# Patient Record
Sex: FEMALE | Race: WHITE | NOT HISPANIC OR LATINO | Employment: FULL TIME | URBAN - METROPOLITAN AREA
[De-identification: names, ages, dates, MRNs, and addresses within clinical notes are randomized per-mention and may not be internally consistent; named-entity substitution may affect disease eponyms.]

---

## 2017-02-27 ENCOUNTER — OFFICE VISIT (OUTPATIENT)
Dept: INTERNAL MEDICINE | Facility: CLINIC | Age: 47
End: 2017-02-27
Payer: COMMERCIAL

## 2017-02-27 VITALS
DIASTOLIC BLOOD PRESSURE: 78 MMHG | RESPIRATION RATE: 15 BRPM | WEIGHT: 178 LBS | HEART RATE: 77 BPM | BODY MASS INDEX: 29.62 KG/M2 | SYSTOLIC BLOOD PRESSURE: 108 MMHG

## 2017-02-27 DIAGNOSIS — Z72.0 TOBACCO ABUSE: ICD-10-CM

## 2017-02-27 DIAGNOSIS — E03.8 SUBCLINICAL HYPOTHYROIDISM: Primary | ICD-10-CM

## 2017-02-27 LAB
T4 FREE SERPL-MCNC: 0.97 NG/DL (ref 0.76–1.46)
TSH SERPL DL<=0.005 MIU/L-ACNC: 6.91 MU/L (ref 0.4–4)

## 2017-02-27 PROCEDURE — 99214 OFFICE O/P EST MOD 30 MIN: CPT | Performed by: INTERNAL MEDICINE

## 2017-02-27 PROCEDURE — 36415 COLL VENOUS BLD VENIPUNCTURE: CPT | Performed by: INTERNAL MEDICINE

## 2017-02-27 PROCEDURE — 84443 ASSAY THYROID STIM HORMONE: CPT | Performed by: INTERNAL MEDICINE

## 2017-02-27 PROCEDURE — 84439 ASSAY OF FREE THYROXINE: CPT | Performed by: INTERNAL MEDICINE

## 2017-02-27 RX ORDER — VARENICLINE TARTRATE 1 MG/1
1 TABLET, FILM COATED ORAL 2 TIMES DAILY
Qty: 56 TABLET | Refills: 1 | Status: SHIPPED | OUTPATIENT
Start: 2017-02-27 | End: 2017-12-06

## 2017-02-27 RX ORDER — LEVOTHYROXINE SODIUM 125 UG/1
125 TABLET ORAL DAILY
Qty: 90 TABLET | Refills: 3 | Status: CANCELLED | OUTPATIENT
Start: 2017-02-27

## 2017-02-27 NOTE — LETTER
[facility]  [address]  [address]      February 27, 2017      Eve Byrd  [address] [place] [address]          Dear Eve,      I have enclosed a copy of your most recent labs done here at the clinic and if available some of your prior labs for comparison.     Your thyroid function tests are still slightly abnormal but stable and slightly improved and should be rechecked here in the clinic in 3 months with a follow-up visit with me.  I will look forward to seeing you at that time and please call to make an appointment.  Let's keep you on the same dose and repeat those labs in 3 months again and if still elevated or higher I would then consider another dose increase.    Please call me if you have further questions.        Prieto Vieira MD

## 2017-02-27 NOTE — MR AVS SNAPSHOT
"              After Visit Summary   2/27/2017    Eve Byrd    MRN: [id number]           Patient Information     Date Of Birth          1970        Visit Information        Provider Department      2/27/2017 10:00 AM Prieto Vieira MD [facility]        Today's Diagnoses     Subclinical hypothyroidism    -  1    Tobacco abuse           Follow-ups after your visit        Future tests that were ordered for you today     Open Future Orders        Priority Expected Expires Ordered    TSH with free T4 reflex Routine 5/1/2017 5/31/2017 2/27/2017            Who to contact     If you have questions or need follow up information about today's clinic visit or your schedule please contact [facility] directly at [phone].  Normal or non-critical lab and imaging results will be communicated to you by [employer]en[employer], letter or phone within 4 business days after the clinic has received the results. If you do not hear from us within 7 days, please contact the clinic through [employer]enU[employer] or phone. If you have a critical or abnormal lab result, we will notify you by phone as soon as possible.  Submit refill requests through [employer] or call your pharmacy and they will forward the refill request to us. Please allow 3 business days for your refill to be completed.          Additional Information About Your Visit        MugenUphart Information     [employer] lets you send messages to your doctor, view your test results, renew your prescriptions, schedule appointments and more. To sign up, go to [email or web address] . Click on \"Log in\" on the left side of the screen, which will take you to the Welcome page. Then click on \"Sign up Now\" on the right side of the page.     You will be asked to enter the access code listed below, as well as some personal information. Please follow the directions to create your username and password.     Your access code is: [email or web address]  Expires: 5/28/2017 " 10:23 AM     Your access code will  in 90 days. If you need help or a new code, please call your [facility] clinic or [phone].        Care EveryWhere ID     This is your Care EveryWhere ID. This could be used by other organizations to access your [facility] medical records  [id number]        Your Vitals Were     Pulse Respirations BMI (Body Mass Index)             77 15 29.62 kg/m2          Blood Pressure from Last 3 Encounters:   17 108/78   11/15/16 120/84   16 118/82    Weight from Last 3 Encounters:   17 178 lb (80.7 kg)   11/15/16 179 lb (81.2 kg)   16 180 lb 4.8 oz (81.8 kg)              We Performed the Following     TSH with free T4 reflex          Today's Medication Changes          These changes are accurate as of: 17 10:23 AM.  If you have any questions, ask your nurse or doctor.               Start taking these medicines.        Dose/Directions    * varenicline 0.5 MG X 11 & 1 MG X 42 tablet   Commonly known as:  CHANTIX STARTING MONTH DIEGO   Used for:  Tobacco abuse   Started by:  Prieto Vieira MD        Take 0.5 mg tab daily for 3 days, then 0.5 mg tab twice daily for 4 days, then 1 mg twice daily.   Quantity:  53 tablet   Refills:  0       * varenicline 1 MG tablet   Commonly known as:  CHANTIX   Used for:  Tobacco abuse   Started by:  Prieto Vieira MD        Dose:  1 mg   Take 1 tablet (1 mg) by mouth 2 times daily Profile   Quantity:  56 tablet   Refills:  1       * Notice:  This list has 2 medication(s) that are the same as other medications prescribed for you. Read the directions carefully, and ask your doctor or other care provider to review them with you.         Where to get your medicines      These medications were sent to [facility]/pharmacy #[id number] - [place], [place] - [address] [address]     Phone:  [phone]     varenicline 0.5 MG X 11 & 1 MG X 42 tablet    varenicline 1 MG tablet                Primary Care  Provider Office Phone # Fax #    Prieto Vieira [phone]       [facility] [address]  [facility] [address]        Thank you!     Thank you for choosing [facility]  for your care. Our goal is always to provide you with excellent care. Hearing back from our patients is one way we can continue to improve our services. Please take a few minutes to complete the written survey that you may receive in the mail after your visit with us. Thank you!             Your Updated Medication List - Protect others around you: Learn how to safely use, store and throw away your medicines at www.disposemymeds.org.          This list is accurate as of: 2/27/17 10:23 AM.  Always use your most recent med list.                   Brand Name Dispense Instructions for use    * albuterol (2.5 MG/3ML) 0.083% neb solution     30 vial    Take 1 vial (2.5 mg) by nebulization every 6 hours as needed for shortness of breath / dyspnea or wheezing       * albuterol 108 (90 BASE) MCG/ACT Inhaler    PROAIR HFA/PROVENTIL HFA/VENTOLIN HFA    1 Inhaler    Inhale 2 puffs into the lungs every 4 hours as needed       lamoTRIgine 25 MG tablet    LaMICtal    60 tablet    1 po BID       LATUDA PO      Take by mouth daily       levothyroxine 112 MCG tablet    SYNTHROID/LEVOTHROID    90 tablet    Take 1 tablet (112 mcg) by mouth daily       mometasone-formoterol 200-5 MCG/ACT oral inhaler    DULERA    13 g    Inhale 2 puffs into the lungs 2 times daily       montelukast 10 MG tablet    SINGULAIR    30 tablet    Take 1 tablet (10 mg) by mouth At Bedtime       * varenicline 0.5 MG X 11 & 1 MG X 42 tablet    CHANTIX STARTING MONTH DIEGO    53 tablet    Take 0.5 mg tab daily for 3 days, then 0.5 mg tab twice daily for 4 days, then 1 mg twice daily.       * varenicline 1 MG tablet    CHANTIX    56 tablet    Take 1 tablet (1 mg) by mouth 2 times daily Profile       * Notice:  This list has 4 medication(s)  that are the same as other medications prescribed for you. Read the directions carefully, and ask your doctor or other care provider to review them with you.

## 2017-02-27 NOTE — PROGRESS NOTES
SUBJECTIVE:                                                    Eve Byrd is a 46 year old female who presents to clinic today for the following health issues:    Hypothyroidism Follow-up      Since last visit, patient describes the following symptoms: Weight stable, no hair loss, no skin changes, no constipation, no loose stools       Amount of exercise or physical activity: minimum    Problems taking medications regularly: No    Medication side effects: none  Diet: low fat/cholesterol    TSH   Date Value Ref Range Status   11/01/2016 7.64 (H) 0.40 - 4.00 mU/L Final   ]    Problem list and histories reviewed & adjusted, as indicated.  Additional history: as documented    Patient Active Problem List   Diagnosis     Muscle weakness (generalized)     Acute respiratory failure (H)     Cholelithiasis     Cholecystitis     Bipolar affective disorder in remission (H)     Tobacco abuse     Past Surgical History   Procedure Laterality Date     Laparoscopic cholecystectomy  6/24/2014     Procedure: LAPAROSCOPIC CHOLECYSTECTOMY;  Surgeon: Maged Mccabe MD;  Location:  OR       Social History   Substance Use Topics     Smoking status: Current Every Day Smoker     Packs/day: 0.50     Years: 30.00     Types: Cigarettes     Smokeless tobacco: Never Used      Comment: 1/2 PPD     Alcohol use 0.0 oz/week     0 Standard drinks or equivalent per week      Comment: Socially      Family History   Problem Relation Age of Onset     DIABETES Maternal Grandfather      DIABETES Paternal Grandfather      Respiratory Mother      COPD         Current Outpatient Prescriptions   Medication Sig Dispense Refill     Lurasidone HCl (LATUDA PO) Take by mouth daily       levothyroxine (SYNTHROID, LEVOTHROID) 112 MCG tablet Take 1 tablet (112 mcg) by mouth daily 90 tablet 3     albuterol (PROAIR HFA, PROVENTIL HFA, VENTOLIN HFA) 108 (90 BASE) MCG/ACT inhaler Inhale 2 puffs into the lungs every 4 hours as needed 1 Inhaler 11      mometasone-formoterol (DULERA) 200-5 MCG/ACT oral inhaler Inhale 2 puffs into the lungs 2 times daily 13 g 5     montelukast (SINGULAIR) 10 MG tablet Take 1 tablet (10 mg) by mouth At Bedtime 30 tablet 0     albuterol (2.5 MG/3ML) 0.083% nebulizer solution Take 1 vial (2.5 mg) by nebulization every 6 hours as needed for shortness of breath / dyspnea or wheezing 30 vial 11     lamoTRIgine (LAMICTAL) 25 MG tablet 1 po BID 60 tablet 0     Allergies   Allergen Reactions     Cats      No Known Drug Allergy      BP Readings from Last 3 Encounters:   11/15/16 120/84   11/01/16 118/82   07/18/15 124/80    Wt Readings from Last 3 Encounters:   11/15/16 179 lb (81.2 kg)   11/01/16 180 lb 4.8 oz (81.8 kg)   07/18/15 190 lb (86.2 kg)                  Labs reviewed in EPIC  Problem list, Medication list, Allergies, and Medical/Social/Surgical histories reviewed in [facility] and updated as appropriate.    ROS:  Also interesterd in quitting smoking and has discussed the use of Chantix with her medications from her Psychiatrist who feels that she could use the medication w/o issues due to her stable medication dosing and clinical course  C: NEGATIVE for fever, chills, change in weight  E/M: NEGATIVE for ear, mouth and throat problems  R: NEGATIVE for significant cough or SOB  CV: NEGATIVE for chest pain, palpitations or peripheral edema  GI: NEGATIVE for nausea, abdominal pain, heartburn, or change in bowel habits  : NEGATIVE for frequency, dysuria, or hematuria  M: NEGATIVE for significant arthralgias or myalgia  H: NEGATIVE for bleeding problems  P: NEGATIVE for changes in mood or affect    OBJECTIVE:                                                    /78  Pulse 77  Resp 15  There is no height or weight on file to calculate BMI.  GENERAL: healthy, alert and no distress  NECK: no tenderness, no adenopathy, no asymmetry, no masses, no stiffness; thyroid- normal to palpation  RESP: lungs clear to auscultation - no rales, no  rhonchi, no wheezes  CV: regular rates and rhythm, normal S1 S2, no S3 or S4 and no murmur, no click or rub   MS: extremities- no gross deformities noted  PSYCH: Alert and oriented times 3; speech- coherent , normal rate and volume; able to articulate logical thoughts, able to abstract reason, no tangential thoughts, no hallucinations or delusions, affect- normal     ASSESSMENT/PLAN:                                                    (E03.9) Subclinical hypothyroidism  (primary encounter diagnosis)  Comment: repeat labs, higher dose adjusted prior  Plan: TSH with free T4 reflex, TSH with free T4         reflex            (Z72.0) Tobacco abuse  Comment: discussed medication.  Plan: varenicline (CHANTIX STARTING MONTH DIEGO) 0.5 MG        X 11 & 1 MG X 42 tablet, varenicline (CHANTIX)         1 MG tablet       There have been warnings to be on the lookout for erratic behavior, suicidal ideation, or suicidal behavior.    Please report any behavior or mood changes as well as being aware of drowsiness.   Be cautious when operating motor vehicles or dangerous machinery until the medication's effect on you is clear.    See Patient Instructions    Prieto Vieira MD  [facility]    25 minutes spent with this patient, face to face, discussing treatment options for listed problems above as well as side effects of appropriate medications.  Counseling time extended beyond 50% of the clinic visit.  Medication dosing, treatment plan and follow-up were discussed. Also reviewed need for primary care testing for patient.

## 2017-05-10 DIAGNOSIS — J45.20 INTERMITTENT ASTHMA, UNCOMPLICATED: ICD-10-CM

## 2017-05-10 RX ORDER — MOMETASONE FUROATE AND FORMOTEROL FUMARATE DIHYDRATE 200; 5 UG/1; UG/1
AEROSOL RESPIRATORY (INHALATION)
Qty: 13 INHALER | Refills: 0 | Status: SHIPPED | OUTPATIENT
Start: 2017-05-10 | End: 2017-12-06

## 2017-05-10 NOTE — TELEPHONE ENCOUNTER
mometasone-formoterol (DULERA) 200-5 MCG/ACT oral inhaler       Last Written Prescription Date: 10/11/16  Last Fill Quantity: 13g, # refills: 5    Last Office Visit with G, P or [facility] prescribing provider:  2/27/17   Future Office Visit:       Date of Last Asthma Action Plan Letter:   Asthma Action Plan Q1 Year    Topic Date Due     Asthma Action Plan - yearly  11/01/2017      Asthma Control Test:   ACT Total Scores 11/1/2016   ACT TOTAL SCORE -   ASTHMA ER VISITS -   ASTHMA HOSPITALIZATIONS -   ACT TOTAL SCORE (Goal Greater than or Equal to 20) 23   In the past 12 months, how many times did you visit the emergency room for your asthma without being admitted to the hospital? 0   In the past 12 months, how many times were you hospitalized overnight because of your asthma? 0       Date of Last Spirometry Test:   No results found for this or any previous visit.

## 2017-08-02 ENCOUNTER — OFFICE VISIT (OUTPATIENT)
Dept: INTERNAL MEDICINE | Facility: CLINIC | Age: 47
End: 2017-08-02
Payer: COMMERCIAL

## 2017-08-02 VITALS
BODY MASS INDEX: 32.95 KG/M2 | HEIGHT: 64 IN | WEIGHT: 193 LBS | OXYGEN SATURATION: 97 % | SYSTOLIC BLOOD PRESSURE: 122 MMHG | DIASTOLIC BLOOD PRESSURE: 78 MMHG | TEMPERATURE: 98.4 F | HEART RATE: 80 BPM

## 2017-08-02 DIAGNOSIS — E03.9 ACQUIRED HYPOTHYROIDISM: Primary | ICD-10-CM

## 2017-08-02 LAB — TSH SERPL DL<=0.005 MIU/L-ACNC: 1.9 MU/L (ref 0.4–4)

## 2017-08-02 PROCEDURE — 36415 COLL VENOUS BLD VENIPUNCTURE: CPT | Performed by: INTERNAL MEDICINE

## 2017-08-02 PROCEDURE — 99213 OFFICE O/P EST LOW 20 MIN: CPT | Performed by: INTERNAL MEDICINE

## 2017-08-02 PROCEDURE — 84443 ASSAY THYROID STIM HORMONE: CPT | Performed by: INTERNAL MEDICINE

## 2017-08-02 RX ORDER — LEVOTHYROXINE SODIUM 112 UG/1
112 TABLET ORAL DAILY
Qty: 90 TABLET | Refills: 3 | Status: SHIPPED | OUTPATIENT
Start: 2017-08-02 | End: 2018-09-01

## 2017-08-02 NOTE — NURSING NOTE
"Chief Complaint   Patient presents with     Thyroid Problem       Initial /78  Pulse 80  Temp 98.4  F (36.9  C) (Oral)  Ht 5' 3.5\" (1.613 m)  Wt 193 lb (87.5 kg)  LMP 04/12/2017  SpO2 97%  BMI 33.65 kg/m2 Estimated body mass index is 33.65 kg/(m^2) as calculated from the following:    Height as of this encounter: 5' 3.5\" (1.613 m).    Weight as of this encounter: 193 lb (87.5 kg).  Medication Reconciliation: complete  "

## 2017-08-02 NOTE — MR AVS SNAPSHOT
"              After Visit Summary   2017    Eve Byrd    MRN: [id number]           Patient Information     Date Of Birth          1970        Visit Information        Provider Department      2017 8:40 AM Prieto Vieira MD [facility]        Today's Diagnoses     Acquired hypothyroidism    -  1       Follow-ups after your visit        Follow-up notes from your care team     Return if symptoms worsen or fail to improve.      Who to contact     If you have questions or need follow up information about today's clinic visit or your schedule please contact [facility] directly at [phone].  Normal or non-critical lab and imaging results will be communicated to you by MyChart, letter or phone within 4 business days after the clinic has received the results. If you do not hear from us within 7 days, please contact the clinic through MyChart or phone. If you have a critical or abnormal lab result, we will notify you by phone as soon as possible.  Submit refill requests through [employer] or call your pharmacy and they will forward the refill request to us. Please allow 3 business days for your refill to be completed.          Additional Information About Your Visit        MyChart Information     [employer] lets you send messages to your doctor, view your test results, renew your prescriptions, schedule appointments and more. To sign up, go to [email or web address] . Click on \"Log in\" on the left side of the screen, which will take you to the Welcome page. Then click on \"Sign up Now\" on the right side of the page.     You will be asked to enter the access code listed below, as well as some personal information. Please follow the directions to create your username and password.     Your access code is: [email or web address]  Expires: 10/31/2017  9:10 AM     Your access code will  in 90 days. If you need help or a new code, please call your [facility] or " "[phone].        Care EveryWhere ID     This is your Care EveryWhere ID. This could be used by other organizations to access your [place] medical records  [id number]        Your Vitals Were     Pulse Temperature Height Last Period Pulse Oximetry BMI (Body Mass Index)    80 98.4  F (36.9  C) (Oral) 5' 3.5\" (1.613 m) 04/12/2017 97% 33.65 kg/m2       Blood Pressure from Last 3 Encounters:   08/02/17 122/78   02/27/17 108/78   11/15/16 120/84    Weight from Last 3 Encounters:   08/02/17 193 lb (87.5 kg)   02/27/17 178 lb (80.7 kg)   11/15/16 179 lb (81.2 kg)              We Performed the Following     TSH with free T4 reflex          Where to get your medicines      These medications were sent to [facility]/pharmacy #[id number] - [place], [place] - [address] [address]     Phone:  [phone]     levothyroxine 112 MCG tablet          Primary Care Provider Office Phone # Fax #    Prieto Vieira [phone]       [facility] [address] [facility] [address]        Equal Access to Services     HECTOR KRAMER AH: Hadii naila ku hadasho Soomaali, waaxda luqadaha, qaybta kaalmada adeegyada, almaz sebastian. So [facility] [phone].    ATENCIÓN: Si habla español, tiene a chandler disposición servicios gratuitos de asistencia lingüística. Llame al [phone].    We comply with applicable federal civil rights laws and [place] laws. We do not discriminate on the basis of race, color, national origin, age, disability sex, sexual orientation or gender identity.            Thank you!     Thank you for choosing [facility]  for your care. Our goal is always to provide you with excellent care. Hearing back from our patients is one way we can continue to improve our services. Please take a few minutes to complete the written survey that you may receive in the mail after your visit with us. Thank you!             Your Updated " Medication List - Protect others around you: Learn how to safely use, store and throw away your medicines at www.disposemymeds.org.          This list is accurate as of: 8/2/17  9:10 AM.  Always use your most recent med list.                   Brand Name Dispense Instructions for use Diagnosis    albuterol 108 (90 BASE) MCG/ACT Inhaler    PROAIR HFA/PROVENTIL HFA/VENTOLIN HFA    1 Inhaler    Inhale 2 puffs into the lungs every 4 hours as needed    Intermittent asthma, uncomplicated       DULERA 200-5 MCG/ACT oral inhaler   Generic drug:  mometasone-formoterol     13 Inhaler    INHALE 2 PUFFS INTO THE LUNGS 2 TIMES DAILY    Intermittent asthma, uncomplicated       lamoTRIgine 25 MG tablet    LaMICtal    60 tablet    1 po BID    Bipolar disorder (H)       LATUDA PO      Take by mouth daily        levothyroxine 112 MCG tablet    SYNTHROID/LEVOTHROID    90 tablet    Take 1 tablet (112 mcg) by mouth daily    Acquired hypothyroidism       montelukast 10 MG tablet    SINGULAIR    30 tablet    Take 1 tablet (10 mg) by mouth At Bedtime    Moderate persistent asthma       * varenicline 0.5 MG X 11 & 1 MG X 42 tablet    CHANTIX STARTING MONTH DIEGO    53 tablet    Take 0.5 mg tab daily for 3 days, then 0.5 mg tab twice daily for 4 days, then 1 mg twice daily.    Tobacco abuse       * varenicline 1 MG tablet    CHANTIX    56 tablet    Take 1 tablet (1 mg) by mouth 2 times daily Profile    Tobacco abuse       * Notice:  This list has 2 medication(s) that are the same as other medications prescribed for you. Read the directions carefully, and ask your doctor or other care provider to review them with you.

## 2017-08-02 NOTE — PROGRESS NOTES
SUBJECTIVE:                                                    Eve Byrd is a 47 year old female who presents to clinic today for the following health issues:    Hypothyroidism Follow-up      Since last visit, patient describes the following symptoms: Weight stable, no hair loss, no skin changes, no constipation, no loose stools      Amount of exercise or physical activity: None    Problems taking medications regularly: No    Medication side effects: none    Diet: regular (no restrictions)    Problem list and histories reviewed & adjusted, as indicated.  Additional history: as documented    Patient Active Problem List   Diagnosis     Muscle weakness (generalized)     Acute respiratory failure (H)     Cholelithiasis     Cholecystitis     Bipolar affective disorder in remission (H)     Tobacco abuse     Past Surgical History:   Procedure Laterality Date     LAPAROSCOPIC CHOLECYSTECTOMY  6/24/2014    Procedure: LAPAROSCOPIC CHOLECYSTECTOMY;  Surgeon: Maged Mccabe MD;  Location:  OR       Social History   Substance Use Topics     Smoking status: Current Every Day Smoker     Packs/day: 0.50     Years: 30.00     Types: Cigarettes     Smokeless tobacco: Never Used      Comment: 1/2 PPD     Alcohol use 0.0 oz/week     0 Standard drinks or equivalent per week      Comment: Socially      Family History   Problem Relation Age of Onset     DIABETES Maternal Grandfather      DIABETES Paternal Grandfather      Respiratory Mother      COPD         Current Outpatient Prescriptions   Medication Sig Dispense Refill     DULERA 200-5 MCG/ACT oral inhaler INHALE 2 PUFFS INTO THE LUNGS 2 TIMES DAILY 13 Inhaler 0     varenicline (CHANTIX STARTING MONTH PAK) 0.5 MG X 11 & 1 MG X 42 tablet Take 0.5 mg tab daily for 3 days, then 0.5 mg tab twice daily for 4 days, then 1 mg twice daily. 53 tablet 0     varenicline (CHANTIX) 1 MG tablet Take 1 tablet (1 mg) by mouth 2 times daily Profile 56 tablet 1     Lurasidone HCl (LATUDA  "PO) Take by mouth daily       levothyroxine (SYNTHROID, LEVOTHROID) 112 MCG tablet Take 1 tablet (112 mcg) by mouth daily 90 tablet 3     albuterol (PROAIR HFA, PROVENTIL HFA, VENTOLIN HFA) 108 (90 BASE) MCG/ACT inhaler Inhale 2 puffs into the lungs every 4 hours as needed 1 Inhaler 11     montelukast (SINGULAIR) 10 MG tablet Take 1 tablet (10 mg) by mouth At Bedtime 30 tablet 0     albuterol (2.5 MG/3ML) 0.083% nebulizer solution Take 1 vial (2.5 mg) by nebulization every 6 hours as needed for shortness of breath / dyspnea or wheezing 30 vial 11     lamoTRIgine (LAMICTAL) 25 MG tablet 1 po BID 60 tablet 0     Allergies   Allergen Reactions     Cats      No Known Drug Allergy      BP Readings from Last 3 Encounters:   02/27/17 108/78   11/15/16 120/84   11/01/16 118/82    Wt Readings from Last 3 Encounters:   02/27/17 178 lb (80.7 kg)   11/15/16 179 lb (81.2 kg)   11/01/16 180 lb 4.8 oz (81.8 kg)              Labs reviewed in EPIC      Reviewed and updated as needed this visit by clinical staff     Reviewed and updated as needed this visit by Provider         ROS:  C: NEGATIVE for fever, chills, change in weight  E/M: NEGATIVE for ear, mouth and throat problems  R: NEGATIVE for significant cough or SOB  CV: NEGATIVE for chest pain, palpitations or peripheral edema  GI: NEGATIVE for nausea, abdominal pain, heartburn, or change in bowel habits  : NEGATIVE for frequency, dysuria, or hematuria  M: NEGATIVE for significant arthralgias or myalgia  N: NEGATIVE for weakness, dizziness or paresthesias  H: NEGATIVE for bleeding problems  P: NEGATIVE for changes in mood or affect    OBJECTIVE:                                                    /78  Pulse 80  Temp 98.4  F (36.9  C) (Oral)  Ht 5' 3.5\" (1.613 m)  Wt 193 lb (87.5 kg)  LMP 04/12/2017  SpO2 97%  BMI 33.65 kg/m2  Body mass index is 33.65 kg/(m^2).  GENERAL: healthy, alert and no distress  EYES: Eyes grossly normal to inspection, extraocular movements - " intact, and PERRL  HENT: ear canals- normal; TMs- normal; Nose- normal; Mouth- no ulcers, no lesions  NECK: no tenderness, no adenopathy, no asymmetry, no masses, no stiffness; thyroid- normal to visual inspection.  RESP: lungs clear to auscultation - no rales, no rhonchi, no wheezes  CV: regular rates and rhythm, normal S1 S2, no S3 or S4 and no click or rub   MS: extremities- no gross deformities noted  PSYCH: Alert and oriented times 3; speech- coherent , normal rate and volume; able to articulate logical thoughts, able to abstract reason, no tangential thoughts, no hallucinations or delusions, affect- normal     ASSESSMENT/PLAN:                                                      (E03.9) Acquired hypothyroidism  (primary encounter diagnosis)  Comment: repeat labs as last check subclinical  Plan: TSH with free T4 reflex, levothyroxine         (SYNTHROID/LEVOTHROID) 112 MCG tablet              See Patient Instructions    Prieto Vieira MD  [facility]

## 2017-08-02 NOTE — LETTER
[facility]  [address]  [address]      August 2, 2017      Eve Byrd  [address] [place] [id number]          Dear Eve,      I have enclosed a copy of your most recent labs done here at the clinic and if available some of your prior labs for comparison.     Your thyroid function tests look good and thus I would not change anything at this point.    Please call me if you have further questions.        Prieto Vieira MD

## 2017-11-29 ENCOUNTER — RADIANT APPOINTMENT (OUTPATIENT)
Dept: MAMMOGRAPHY | Facility: CLINIC | Age: 47
End: 2017-11-29
Attending: INTERNAL MEDICINE
Payer: COMMERCIAL

## 2017-11-29 DIAGNOSIS — Z12.31 VISIT FOR SCREENING MAMMOGRAM: ICD-10-CM

## 2017-11-29 PROCEDURE — G0202 SCR MAMMO BI INCL CAD: HCPCS | Mod: TC

## 2017-12-06 ENCOUNTER — OFFICE VISIT (OUTPATIENT)
Dept: INTERNAL MEDICINE | Facility: CLINIC | Age: 47
End: 2017-12-06
Payer: COMMERCIAL

## 2017-12-06 VITALS
OXYGEN SATURATION: 92 % | SYSTOLIC BLOOD PRESSURE: 116 MMHG | DIASTOLIC BLOOD PRESSURE: 70 MMHG | HEART RATE: 82 BPM | HEIGHT: 64 IN | WEIGHT: 204 LBS | TEMPERATURE: 98.2 F | BODY MASS INDEX: 34.83 KG/M2

## 2017-12-06 DIAGNOSIS — Z72.0 TOBACCO ABUSE: ICD-10-CM

## 2017-12-06 DIAGNOSIS — Z13.6 CARDIOVASCULAR SCREENING; LDL GOAL LESS THAN 160: ICD-10-CM

## 2017-12-06 DIAGNOSIS — Z00.00 ENCOUNTER FOR ROUTINE ADULT HEALTH EXAMINATION WITHOUT ABNORMAL FINDINGS: Primary | ICD-10-CM

## 2017-12-06 DIAGNOSIS — J45.20 MILD INTERMITTENT ASTHMA, UNSPECIFIED WHETHER COMPLICATED: ICD-10-CM

## 2017-12-06 DIAGNOSIS — F31.70 BIPOLAR AFFECTIVE DISORDER IN REMISSION (H): ICD-10-CM

## 2017-12-06 DIAGNOSIS — E03.9 ACQUIRED HYPOTHYROIDISM: ICD-10-CM

## 2017-12-06 LAB
ALBUMIN SERPL-MCNC: 4.1 G/DL (ref 3.4–5)
ALP SERPL-CCNC: 76 U/L (ref 40–150)
ALT SERPL W P-5'-P-CCNC: 38 U/L (ref 0–50)
ANION GAP SERPL CALCULATED.3IONS-SCNC: 8 MMOL/L (ref 3–14)
AST SERPL W P-5'-P-CCNC: 21 U/L (ref 0–45)
BILIRUB SERPL-MCNC: 0.2 MG/DL (ref 0.2–1.3)
BUN SERPL-MCNC: 9 MG/DL (ref 7–30)
CALCIUM SERPL-MCNC: 8.8 MG/DL (ref 8.5–10.1)
CHLORIDE SERPL-SCNC: 108 MMOL/L (ref 94–109)
CHOLEST SERPL-MCNC: 251 MG/DL
CO2 SERPL-SCNC: 26 MMOL/L (ref 20–32)
CREAT SERPL-MCNC: 1.08 MG/DL (ref 0.52–1.04)
GFR SERPL CREATININE-BSD FRML MDRD: 54 ML/MIN/1.7M2
GLUCOSE SERPL-MCNC: 95 MG/DL (ref 70–99)
HDLC SERPL-MCNC: 83 MG/DL
HGB BLD-MCNC: 12.8 G/DL (ref 11.7–15.7)
LDLC SERPL CALC-MCNC: 122 MG/DL
NONHDLC SERPL-MCNC: 168 MG/DL
POTASSIUM SERPL-SCNC: 4 MMOL/L (ref 3.4–5.3)
PROT SERPL-MCNC: 7.7 G/DL (ref 6.8–8.8)
SODIUM SERPL-SCNC: 142 MMOL/L (ref 133–144)
TRIGL SERPL-MCNC: 232 MG/DL

## 2017-12-06 PROCEDURE — 85018 HEMOGLOBIN: CPT | Performed by: INTERNAL MEDICINE

## 2017-12-06 PROCEDURE — 80061 LIPID PANEL: CPT | Performed by: INTERNAL MEDICINE

## 2017-12-06 PROCEDURE — 80053 COMPREHEN METABOLIC PANEL: CPT | Performed by: INTERNAL MEDICINE

## 2017-12-06 PROCEDURE — 36415 COLL VENOUS BLD VENIPUNCTURE: CPT | Performed by: INTERNAL MEDICINE

## 2017-12-06 PROCEDURE — 99396 PREV VISIT EST AGE 40-64: CPT | Performed by: INTERNAL MEDICINE

## 2017-12-06 NOTE — LETTER
[facility]  [address] [address]  [phone]      12/6/2017       Eve Byrd  [address] [place] [id number]        Dear Eve,    I am pleased to inform you that your routine blood work including your hemoglobin, sodium, potassium, calcium and liver function tests are all normal.    Your cholesterol is high and could be treated more aggressively.  Please follow-up with me to discuss your further medication options if you are interested.    Your kidney function test is also slightly abnormal and elevated and should be rechecked here in the clinic in 3 months with a follow-up visit with me.  I will look forward to seeing you at that time and please call to make an appointment.    Sincerely,      Prieto Vieira MD  Internal Medicine

## 2017-12-06 NOTE — MR AVS SNAPSHOT
After Visit Summary   12/6/2017    Eve Byrd    MRN: [id number]           Patient Information     Date Of Birth          1970        Visit Information        Provider Department      12/6/2017 10:20 AM Prieto Vieira MD [facility]        Today's Diagnoses     Encounter for routine adult health examination without abnormal findings    -  1    Acquired hypothyroidism        Bipolar affective disorder in remission (H)        Mild intermittent asthma, unspecified whether complicated        CARDIOVASCULAR SCREENING; LDL GOAL LESS THAN 160        Tobacco abuse          Care Instructions      Preventive Health Recommendations  Female Ages 40 to 49    Yearly exam:     See your health care provider every year in order to  1. Review health changes.   2. Discuss preventive care.    3. Review your medicines if your doctor prescribed any.      Get a Pap test every three years (unless you have an abnormal result and your provider advises testing more often).      If you get Pap tests with HPV test, you only need to test every 5 years, unless you have an abnormal result. You do not need a Pap test if your uterus was removed (hysterectomy) and you have not had cancer.      You should be tested each year for STDs (sexually transmitted diseases), if you're at risk.       Ask your doctor if you should have a mammogram.      Have a colonoscopy (test for colon cancer) if someone in your family has had colon cancer or polyps before age 50.       Have a cholesterol test every 5 years.       Have a diabetes test (fasting glucose) after age 45. If you are at risk for diabetes, you should have this test every 3 years.    Shots: Get a flu shot each year. Get a tetanus shot every 10 years.     Nutrition:     Eat at least 5 servings of fruits and vegetables each day.    Eat whole-grain bread, whole-wheat pasta and brown rice instead of white grains and rice.    Talk to your provider about  "Calcium and Vitamin D.     Lifestyle    Exercise at least 150 minutes a week (an average of 30 minutes a day, 5 days a week). This will help you control your weight and prevent disease.    Limit alcohol to one drink per day.    No smoking.     Wear sunscreen to prevent skin cancer.    See your dentist every six months for an exam and cleaning.          Follow-ups after your visit        Who to contact     If you have questions or need follow up information about today's clinic visit or your schedule please contact [facility] directly at [phone].  Normal or non-critical lab and imaging results will be communicated to you by [employer], letter or phone within 4 business days after the clinic has received the results. If you do not hear from us within 7 days, please contact the clinic through [employer] or phone. If you have a critical or abnormal lab result, we will notify you by phone as soon as possible.  Submit refill requests through [employer] or call your pharmacy and they will forward the refill request to us. Please allow 3 business days for your refill to be completed.          Additional Information About Your Visit        [employer] Information     [employer] lets you send messages to your doctor, view your test results, renew your prescriptions, schedule appointments and more. To sign up, go to [email or web address] . Click on \"Log in\" on the left side of the screen, which will take you to the Welcome page. Then click on \"Sign up Now\" on the right side of the page.     You will be asked to enter the access code listed below, as well as some personal information. Please follow the directions to create your username and password.     Your access code is: [email or web address]  Expires: 3/6/2018 10:40 AM     Your access code will  in 90 days. If you need help or a new code, please call your [facility] or [phone].        Care EveryWhere ID     This is your Care EveryWhere ID. This could " "be used by other organizations to access your [facility] medical records  [id number]        Your Vitals Were     Pulse Temperature Height Pulse Oximetry BMI (Body Mass Index)       82 98.2  F (36.8  C) (Oral) 5' 3.5\" (1.613 m) 92% 35.57 kg/m2        Blood Pressure from Last 3 Encounters:   12/06/17 116/70   08/02/17 122/78   02/27/17 108/78    Weight from Last 3 Encounters:   12/06/17 204 lb (92.5 kg)   08/02/17 193 lb (87.5 kg)   02/27/17 178 lb (80.7 kg)              We Performed the Following     Comprehensive metabolic panel     Hemoglobin     Lipid Profile        Primary Care Provider Office Phone # Fax #    Prieto Vieira [phone]       [address] [facility] [address]        Equal Access to Services     [facility]: Hadii aad ku hadasho Soomaali, waaxda luqadaha, qaybta kaalmada adeegyada, waxay larisain hayaan flavia bean . So [facility] [phone].    ATENCIÓN: Si habla español, tiene a chandler disposición servicios gratuitos de asistencia lingüística. Llame al [phone].    We comply with applicable federal civil rights laws and [place] laws. We do not discriminate on the basis of race, color, national origin, age, disability, sex, sexual orientation, or gender identity.            Thank you!     Thank you for choosing [facility]  for your care. Our goal is always to provide you with excellent care. Hearing back from our patients is one way we can continue to improve our services. Please take a few minutes to complete the written survey that you may receive in the mail after your visit with us. Thank you!             Your Updated Medication List - Protect others around you: Learn how to safely use, store and throw away your medicines at www.disposemymeds.org.          This list is accurate as of: 12/6/17 10:40 AM.  Always use your most recent med list.                   Brand Name Dispense Instructions for use Diagnosis    albuterol 108 (90 BASE) " MCG/ACT Inhaler    PROAIR HFA/PROVENTIL HFA/VENTOLIN HFA    1 Inhaler    Inhale 2 puffs into the lungs every 4 hours as needed    Intermittent asthma, uncomplicated       lamoTRIgine 25 MG tablet    LaMICtal    60 tablet    1 po BID    Bipolar disorder (H)       LATUDA PO      Take by mouth daily        levothyroxine 112 MCG tablet    SYNTHROID/LEVOTHROID    90 tablet    Take 1 tablet (112 mcg) by mouth daily    Acquired hypothyroidism       montelukast 10 MG tablet    SINGULAIR    30 tablet    Take 1 tablet (10 mg) by mouth At Bedtime    Moderate persistent asthma

## 2017-12-06 NOTE — NURSING NOTE
"Chief Complaint   Patient presents with     Physical       Initial /70  Pulse 82  Temp 98.2  F (36.8  C) (Oral)  Ht 5' 3.5\" (1.613 m)  Wt 204 lb (92.5 kg)  SpO2 92%  BMI 35.57 kg/m2 Estimated body mass index is 35.57 kg/(m^2) as calculated from the following:    Height as of this encounter: 5' 3.5\" (1.613 m).    Weight as of this encounter: 204 lb (92.5 kg).  Medication Reconciliation: complete   Sue Ng CMA      "

## 2017-12-07 ASSESSMENT — ASTHMA QUESTIONNAIRES: ACT_TOTALSCORE: 24

## 2018-09-01 DIAGNOSIS — E03.9 ACQUIRED HYPOTHYROIDISM: ICD-10-CM

## 2018-09-01 NOTE — TELEPHONE ENCOUNTER
"Requested Prescriptions   Pending Prescriptions Disp Refills     levothyroxine (SYNTHROID/LEVOTHROID) 112 MCG tablet [Pharmacy Med Name: LEVOTHYROXINE 112 MCG TABLET] 90 tablet 2    Last Written Prescription Date:  8/2/2017  Last Fill Quantity: 90,  # refills: 3   Last office visit: 12/6/2017 with prescribing provider:  12/6/2017   Future Office Visit:     Sig: TAKE 1 TABLET (112 MCG) BY MOUTH DAILY    Thyroid Protocol Failed    9/1/2018  2:04 AM       Failed - Normal TSH on file in past 12 months    Recent Labs   Lab Test  08/02/17   0907   TSH  1.90             Passed - Patient is 12 years or older       Passed - Recent (12 mo) or future (30 days) visit within the authorizing provider's specialty    Patient had office visit in the last 12 months or has a visit in the next 30 days with authorizing provider or within the authorizing provider's specialty.  See \"Patient Info\" tab in inbasket, or \"Choose Columns\" in Meds & Orders section of the refill encounter.           Passed - No active pregnancy on record    If patient is pregnant or has had a positive pregnancy test, please check TSH.         Passed - No positive pregnancy test in past 12 months    If patient is pregnant or has had a positive pregnancy test, please check TSH.            "

## 2018-09-04 RX ORDER — LEVOTHYROXINE SODIUM 112 UG/1
TABLET ORAL
Qty: 30 TABLET | Refills: 0 | Status: SHIPPED | OUTPATIENT
Start: 2018-09-04 | End: 2019-01-16

## 2019-01-03 ENCOUNTER — HOSPITAL ENCOUNTER (EMERGENCY)
Facility: CLINIC | Age: 49
Discharge: HOME | End: 2019-01-03
Attending: EMERGENCY MEDICINE | Admitting: EMERGENCY MEDICINE
Payer: COMMERCIAL

## 2019-01-03 ENCOUNTER — APPOINTMENT (OUTPATIENT)
Dept: GENERAL RADIOLOGY | Facility: CLINIC | Age: 49
End: 2019-01-03
Payer: COMMERCIAL

## 2019-01-03 VITALS
OXYGEN SATURATION: 98 % | TEMPERATURE: 99.3 F | HEART RATE: 87 BPM | BODY MASS INDEX: 34.15 KG/M2 | RESPIRATION RATE: 16 BRPM | SYSTOLIC BLOOD PRESSURE: 140 MMHG | HEIGHT: 64 IN | DIASTOLIC BLOOD PRESSURE: 100 MMHG | WEIGHT: 200 LBS

## 2019-01-03 DIAGNOSIS — K29.00 ACUTE SUPERFICIAL GASTRITIS WITHOUT HEMORRHAGE: ICD-10-CM

## 2019-01-03 DIAGNOSIS — R06.02 SHORTNESS OF BREATH: ICD-10-CM

## 2019-01-03 DIAGNOSIS — F41.9 ANXIETY: ICD-10-CM

## 2019-01-03 DIAGNOSIS — R07.9 ACUTE CHEST PAIN: ICD-10-CM

## 2019-01-03 LAB
ALBUMIN SERPL-MCNC: 4.5 G/DL (ref 3.4–5)
ALP SERPL-CCNC: 72 U/L (ref 40–150)
ALT SERPL W P-5'-P-CCNC: 80 U/L (ref 0–50)
ANION GAP SERPL CALCULATED.3IONS-SCNC: 13 MMOL/L (ref 3–14)
AST SERPL W P-5'-P-CCNC: 49 U/L (ref 0–45)
BASOPHILS # BLD AUTO: 0 10E9/L (ref 0–0.2)
BASOPHILS NFR BLD AUTO: 0.6 %
BILIRUB SERPL-MCNC: 0.6 MG/DL (ref 0.2–1.3)
BUN SERPL-MCNC: 12 MG/DL (ref 7–30)
CALCIUM SERPL-MCNC: 9.1 MG/DL (ref 8.5–10.1)
CHLORIDE SERPL-SCNC: 106 MMOL/L (ref 94–109)
CO2 SERPL-SCNC: 20 MMOL/L (ref 20–32)
CREAT SERPL-MCNC: 1.07 MG/DL (ref 0.52–1.04)
D DIMER PPP FEU-MCNC: 0.5 UG/ML FEU (ref 0–0.5)
DIFFERENTIAL METHOD BLD: ABNORMAL
EOSINOPHIL # BLD AUTO: 0.1 10E9/L (ref 0–0.7)
EOSINOPHIL NFR BLD AUTO: 2.3 %
ERYTHROCYTE [DISTWIDTH] IN BLOOD BY AUTOMATED COUNT: 12.2 % (ref 10–15)
GFR SERPL CREATININE-BSD FRML MDRD: 61 ML/MIN/{1.73_M2}
GLUCOSE SERPL-MCNC: 97 MG/DL (ref 70–99)
HCT VFR BLD AUTO: 41.3 % (ref 35–47)
HGB BLD-MCNC: 14.3 G/DL (ref 11.7–15.7)
IMM GRANULOCYTES # BLD: 0 10E9/L (ref 0–0.4)
IMM GRANULOCYTES NFR BLD: 0.3 %
INTERPRETATION ECG - MUSE: NORMAL
LIPASE SERPL-CCNC: 170 U/L (ref 73–393)
LYMPHOCYTES # BLD AUTO: 1.1 10E9/L (ref 0.8–5.3)
LYMPHOCYTES NFR BLD AUTO: 31.1 %
MCH RBC QN AUTO: 35.1 PG (ref 26.5–33)
MCHC RBC AUTO-ENTMCNC: 34.6 G/DL (ref 31.5–36.5)
MCV RBC AUTO: 102 FL (ref 78–100)
MONOCYTES # BLD AUTO: 0.3 10E9/L (ref 0–1.3)
MONOCYTES NFR BLD AUTO: 8.5 %
NEUTROPHILS # BLD AUTO: 2 10E9/L (ref 1.6–8.3)
NEUTROPHILS NFR BLD AUTO: 57.2 %
NRBC # BLD AUTO: 0 10*3/UL
NRBC BLD AUTO-RTO: 0 /100
PLATELET # BLD AUTO: 179 10E9/L (ref 150–450)
POTASSIUM SERPL-SCNC: 3.9 MMOL/L (ref 3.4–5.3)
PROT SERPL-MCNC: 8.3 G/DL (ref 6.8–8.8)
RBC # BLD AUTO: 4.07 10E12/L (ref 3.8–5.2)
SODIUM SERPL-SCNC: 139 MMOL/L (ref 133–144)
TROPONIN I SERPL-MCNC: <0.015 UG/L (ref 0–0.04)
WBC # BLD AUTO: 3.5 10E9/L (ref 4–11)

## 2019-01-03 PROCEDURE — 85379 FIBRIN DEGRADATION QUANT: CPT | Performed by: EMERGENCY MEDICINE

## 2019-01-03 PROCEDURE — 99285 EMERGENCY DEPT VISIT HI MDM: CPT | Mod: 25

## 2019-01-03 PROCEDURE — 25000132 ZZH RX MED GY IP 250 OP 250 PS 637: Performed by: EMERGENCY MEDICINE

## 2019-01-03 PROCEDURE — 25000125 ZZHC RX 250: Performed by: EMERGENCY MEDICINE

## 2019-01-03 PROCEDURE — 85025 COMPLETE CBC W/AUTO DIFF WBC: CPT | Performed by: EMERGENCY MEDICINE

## 2019-01-03 PROCEDURE — 80053 COMPREHEN METABOLIC PANEL: CPT | Performed by: EMERGENCY MEDICINE

## 2019-01-03 PROCEDURE — 71046 X-RAY EXAM CHEST 2 VIEWS: CPT

## 2019-01-03 PROCEDURE — 83690 ASSAY OF LIPASE: CPT | Performed by: EMERGENCY MEDICINE

## 2019-01-03 PROCEDURE — 93005 ELECTROCARDIOGRAM TRACING: CPT

## 2019-01-03 PROCEDURE — 84484 ASSAY OF TROPONIN QUANT: CPT | Performed by: EMERGENCY MEDICINE

## 2019-01-03 RX ORDER — SUCRALFATE ORAL 1 G/10ML
1 SUSPENSION ORAL 4 TIMES DAILY
Qty: 1200 ML | Refills: 0 | Status: SHIPPED | OUTPATIENT
Start: 2019-01-03 | End: 2019-07-17

## 2019-01-03 RX ORDER — PANTOPRAZOLE SODIUM 40 MG/1
40 TABLET, DELAYED RELEASE ORAL DAILY
Qty: 30 TABLET | Refills: 0 | Status: SHIPPED | OUTPATIENT
Start: 2019-01-03 | End: 2019-07-17

## 2019-01-03 RX ADMIN — LIDOCAINE HYDROCHLORIDE 30 ML: 20 SOLUTION ORAL; TOPICAL at 20:38

## 2019-01-03 ASSESSMENT — ENCOUNTER SYMPTOMS
VOMITING: 0
SHORTNESS OF BREATH: 0
DIAPHORESIS: 1
TREMORS: 1
NERVOUS/ANXIOUS: 1
BACK PAIN: 1
NAUSEA: 0

## 2019-01-03 ASSESSMENT — MIFFLIN-ST. JEOR: SCORE: 1517.19

## 2019-01-03 NOTE — LETTER
January 3, 2019      To Whom It May Concern:      Eve Byrd was seen in our Emergency Department today, 01/03/19.  I expect her condition to improve over the next 1-2 days.  She may return to work/school when improved.    Sincerely,        Lesli MONTOYA RN

## 2019-01-03 NOTE — ED AVS SNAPSHOT
Emergency Department  [address] [facility] [address]  Phone:  [phone]  Fax:  [phone]                                    Eve Byrd   MRN: [id number]    Department:   Emergency Department   Date of Visit:  1/3/2019           After Visit Summary Signature Page    I have received my discharge instructions, and my questions have been answered. I have discussed any challenges I see with this plan with the nurse or doctor.    ..........................................................................................................................................  Patient/Patient Representative Signature      ..........................................................................................................................................  Patient Representative Print Name and Relationship to Patient    ..................................................               ................................................  Date                                   Time    ..........................................................................................................................................  Reviewed by Signature/Title    ...................................................              ..............................................  Date                                               Time          22EPIC Rev 08/18

## 2019-01-04 NOTE — ED PROVIDER NOTES
"  History     Chief Complaint:  Chest Pain    HPI   Eve Byrd is a 48 year old female with a history of HLD who presents to the emergency department for evaluation of chest pain. The patient reports she has experienced around 2 days of sternal chest pain and pressure radiating to her back, accompanied by some diaphoresis, but denies any shortness of breath, nausea. She remarks this pain is different from her acid reflux in the past. She indicates she has a history of bipolar and depression and notes concern for increased anxiety and shakiness. She notes her asthma has largely improved since she quit smoking.    Allergies:  NKDA     Medications:    Albuterol  Lamictal  Levothyroxine  Latuda  Singulair     Past Medical History:    Asthma  Bipolar disorder  Hypothyroidism    Past Surgical History:    Cholecystectomy    Family History:    COPD    Social History:  Presents alone.  Former smoker.  Positive for alcohol use.   Marital Status:  Single [1]     Review of Systems   Constitutional: Positive for diaphoresis.   Respiratory: Negative for shortness of breath.    Cardiovascular: Positive for chest pain.   Gastrointestinal: Negative for nausea and vomiting.   Musculoskeletal: Positive for back pain.   Neurological: Positive for tremors.   Psychiatric/Behavioral: The patient is nervous/anxious.    All other systems reviewed and are negative.        Physical Exam     Patient Vitals for the past 24 hrs:   BP Temp Temp src Heart Rate Resp SpO2 Height Weight   01/03/19 1907 (!) 160/109 99.3  F (37.4  C) Temporal 95 16 97 % 1.626 m (5' 4\") 90.7 kg (200 lb)     Physical Exam  General: Patient is alert and anxious appearing.  HEENT: Head atraumatic    Eyes: pupils equal and reactive. Conjunctiva clear   Nares: patent   Oropharynx: no lesions, uvula midline, no palatal draping, normal voice, no trismus  Neck: Supple without lymphadenopathy, no meningismus  Chest: Heart regular rate and rhythm.   Lungs: Equal clear to " auscultation with no wheeze or rales  Abdomen: Soft, epigastric tenderness to palpation,  nondistended, normal bowel sounds  Back: No costovertebral angle tenderness, no midline C, T or L spine tenderness  Neuro: Grossly nonfocal, normal speech, strength equal bilaterally, CN 2-12 intact  Extremities: No deformities, equal radial and DP pulses. No clubbing, cyanosis.  No edema  Skin: Warm and dry with no rash.       Emergency Department Course   ECG:  Time: 1911  Vent. Rate 86 bpm. VT interval 158. QRS duration 82. QT/QTc 392/469. P-R-T axis 70 49 70. Normal sinus rhythm. Normal ECG. Read time: 1942    Imaging:  Radiographic findings were communicated with the patient who voiced understanding of the findings.    XR Chest 2 views:   No active infiltrate. As per radiology.     Laboratory:  CBC: WBC: 3.5 (L), HGB: 14.3, PLT: 179  CMP: Creatinine 1.07 (H), ALT 80 (H), AST 49 (H), o/w WNL     D dimer: 0.5    Lipase: 170    2000 Troponin I: <0.015    Interventions:  2038 GI Cocktail 30 mL PO    Emergency Department Course:  Nursing notes and vitals reviewed. 1945 I performed an exam of the patient as documented above.     EKG obtained in the ED, see results above.     IV inserted. Medicine administered as documented above. Blood drawn. This was sent to the lab for further testing, results above.    The patient was sent for a XR Chest while in the emergency department, findings above.     2117 I rechecked the patient and discussed the results of her workup thus far. The patient reports feeling improved after GI cocktail.    Findings and plan explained to the Patient. Patient discharged home with instructions regarding supportive care, medications, and reasons to return. The importance of close follow-up was reviewed. The patient was prescribed Carafate, Protonix.    I personally reviewed the laboratory results with the Patient and answered all related questions prior to discharge.     Impression & Plan      Medical  Decision Making:  Patient is a 48-year-old female with history of bipolar disorder and anxiety who presents the emergency department with chest pain radiating to her back.  Her pain is located in the epigastric region and she states it has been sitting there for several days with various intensity but there constantly.  Her EKG here is normal with no signs of acute ischemia and troponin is negative.  Feel fairly certain this is not an acute MI tonight but will order an outpatient cardiac stress testing to complete her workup for this.  Her lipase was within normal limits and she had met minimal elevation of her LFTs.  She had no right upper quadrant tenderness to palpation I do not suspect acute cholecystitis at this time.  Patient's d-dimer was thankfully negative and no further workup for PE was necessary I did not feel.  Patient felt improved with GI cocktail here in the emergency department.  Feel her symptoms are likely gastritis versus peptic ulcer disease related.  Will prescribe her Carafate and Protonix as an outpatient and follow-up with her primary care provider.  No evidence to suggest pancreatitis on examination at this time.  Chest x-ray was thankfully negative for acute infiltrate or pneumothorax.  Do not feel her symptoms are consistent with acute aortic dissection at this time.  With reasonable clinical certainty I feel the patient is safe for discharge.  Will recommend outpatient follow-up with primary care provider and outpatient cardiac stress testing.  Return precautions the emergency department reviewed at length.    Diagnosis:    ICD-10-CM   1. Acute chest pain R07.9   2. Shortness of breath R06.02   3. Anxiety F41.9   4. Acute superficial gastritis without hemorrhage K29.00       Disposition:  discharged to home    Discharge Medications:     Medication List      Started    pantoprazole 40 MG EC tablet  Commonly known as:  PROTONIX  40 mg, Oral, DAILY     sucralfate 1 GM/10ML  suspension  Commonly known as:  CARAFATE  1 g, Oral, 4 TIMES DAILY          I, Matias Degroot, am serving as a scribe on 1/3/2019 at 7:47 PM to personally document services performed by Joya Kowalski MD based on my observations and the provider's statements to me.     Matias Degroot  1/3/2019    EMERGENCY DEPARTMENT       Joya Kowalski MD  01/03/19 2148

## 2019-01-16 DIAGNOSIS — E03.9 ACQUIRED HYPOTHYROIDISM: ICD-10-CM

## 2019-01-16 RX ORDER — LEVOTHYROXINE SODIUM 112 UG/1
112 TABLET ORAL DAILY
Qty: 30 TABLET | Refills: 0 | Status: SHIPPED | OUTPATIENT
Start: 2019-01-16 | End: 2020-02-11

## 2019-01-16 NOTE — TELEPHONE ENCOUNTER
"Requested Prescriptions   Pending Prescriptions Disp Refills     levothyroxine (SYNTHROID/LEVOTHROID) 112 MCG tablet 30 tablet 0    Thyroid Protocol Failed - 1/16/2019 10:41 AM       Failed - Normal TSH on file in past 12 months    Recent Labs   Lab Test 08/02/17  0907   TSH 1.90             Passed - Patient is 12 years or older       Passed - Recent (12 mo) or future (30 days) visit within the authorizing provider's specialty    Patient had office visit in the last 12 months or has a visit in the next 30 days with authorizing provider or within the authorizing provider's specialty.  See \"Patient Info\" tab in inbasket, or \"Choose Columns\" in Meds & Orders section of the refill encounter.             Passed - Medication is active on med list       Passed - No active pregnancy on record    If patient is pregnant or has had a positive pregnancy test, please check TSH.         Passed - No positive pregnancy test in past 12 months    If patient is pregnant or has had a positive pregnancy test, please check TSH.          Routing refill request to provider for review/approval because:  Twyla given x1 and patient did not follow up, please advise  Labs not current:  tsh        "

## 2019-01-16 NOTE — TELEPHONE ENCOUNTER
Requested Prescriptions   Pending Prescriptions Disp Refills     levothyroxine (SYNTHROID/LEVOTHROID) 112 MCG tablet 30 tablet 0    There is no refill protocol information for this order      Last Written Prescription Date:  09/04/18  Last Fill Quantity: 30,  # refills: 0   Last office visit: 12/6/2017 with prescribing provider:  12/06/17   Future Office Visit: 01/23/19  Next 5 appointments (look out 90 days)    Jan 23, 2019 10:00 AM CST  PHYSICAL with Prieto Vieira MD  [facility] [address] [address] [phone]

## 2019-07-17 ENCOUNTER — OFFICE VISIT (OUTPATIENT)
Dept: URGENT CARE | Facility: URGENT CARE | Age: 49
End: 2019-07-17
Payer: COMMERCIAL

## 2019-07-17 VITALS
SYSTOLIC BLOOD PRESSURE: 143 MMHG | BODY MASS INDEX: 34.02 KG/M2 | TEMPERATURE: 97.6 F | DIASTOLIC BLOOD PRESSURE: 96 MMHG | HEART RATE: 92 BPM | WEIGHT: 198.19 LBS

## 2019-07-17 DIAGNOSIS — A08.4 VIRAL GASTROENTERITIS: Primary | ICD-10-CM

## 2019-07-17 PROCEDURE — 99213 OFFICE O/P EST LOW 20 MIN: CPT | Performed by: PHYSICIAN ASSISTANT

## 2019-07-17 NOTE — LETTER
July 17, 2019      Eve Byrd  [address] [facility] [address]        To Whom It May Concern,     Eve Byrd attended clinic here on Jul 17, 2019. She has viral gastroenteritis since 7/14/2019 and has been improving. She may return  to work as tolerated.     If you have questions or concerns, please call the clinic at the number listed above.    Sincerely,         Audrey Ellis PA-C

## 2019-07-18 NOTE — PROGRESS NOTES
Subjective     Eve Byrd is a 48 year old female who presents to clinic today for the following health issues:    HPI     Patient presented to the clinic for the evaluation of her resolving diarrhea and vomiting.  She mentioned that she had onset of diarrhea and vomiting on 7/14/2019.  She for past 3 days she has been having approximately 3 episodes of diarrhea a day and 3 episodes of vomiting a day.  She denied any blood with stool or vomit.  She denied any fever.  She had mild abdominal cramps.  No history of eating outside or any other symptoms.  Has been passing urine fine.  She does not have any urinary symptoms.  Her diarrhea and vomiting has resolved.  She had one episode of diarrhea this morning and one episode of vomiting this morning.  Patient also with like to get a letter for work.  Patient Active Problem List   Diagnosis     Muscle weakness (generalized)     Acute respiratory failure (H)     Cholelithiasis     Cholecystitis     Bipolar affective disorder in remission (H)     Tobacco abuse     Acquired hypothyroidism     CARDIOVASCULAR SCREENING; LDL GOAL LESS THAN 160     Mild intermittent asthma, unspecified whether complicated     Past Surgical History:   Procedure Laterality Date     LAPAROSCOPIC CHOLECYSTECTOMY  6/24/2014    Procedure: LAPAROSCOPIC CHOLECYSTECTOMY;  Surgeon: Maged Mccabe MD;  Location:  OR       Social History     Tobacco Use     Smoking status: Former Smoker     Years: 30.00     Types: Cigarettes     Smokeless tobacco: Former User     Quit date: 3/31/2017   Substance Use Topics     Alcohol use: Yes     Alcohol/week: 0.0 oz     Comment: Socially      Family History   Problem Relation Age of Onset     Diabetes Maternal Grandfather      Diabetes Paternal Grandfather      Respiratory Mother         COPD                    Review of Systems   ROS COMP: Constitutional, HEENT, cardiovascular, pulmonary, gi and gu systems are negative, except as otherwise noted.       Objective    BP (!) 143/96   Pulse 92   Temp 97.6  F (36.4  C) (Oral)   Wt 89.9 kg (198 lb 3 oz)   BMI 34.02 kg/m    Body mass index is 34.02 kg/m .  Physical Exam   GENERAL: healthy, alert and no distress  RESP: lungs clear to auscultation - no rales, rhonchi or wheezes  CV: regular rate and rhythm, normal S1 S2, no S3 or S4, no murmur, click or rub, no peripheral edema and peripheral pulses strong  ABDOMEN: soft, nontender, no hepatosplenomegaly, no masses and bowel sounds normal  MS: no gross musculoskeletal defects noted, no edema  NEURO: Normal strength and tone, mentation intact and speech normal  PSYCH: mentation appears normal, affect normal/bright    Diagnostic Test Results:  none         Assessment & Plan     Viral gastroenteritis:  Rehydration, bland diet, brat diet.  As symptoms have improved no medication has been prescribed.  If symptoms are worsening patient to come back to the urgent care..    High BP:  Patient to follow-up with PCP in 1 week for a repeat blood pressure check.  If symptoms persist antihypertensive management may be required.           No follow-ups on file.    Audrey Ellis PA-C  [facility]

## 2019-09-09 DIAGNOSIS — E03.9 ACQUIRED HYPOTHYROIDISM: ICD-10-CM

## 2019-09-10 RX ORDER — LEVOTHYROXINE SODIUM 112 UG/1
112 TABLET ORAL DAILY
Qty: 30 TABLET | Refills: 0 | OUTPATIENT
Start: 2019-09-10

## 2019-09-10 NOTE — TELEPHONE ENCOUNTER
"Requested Prescriptions   Pending Prescriptions Disp Refills     levothyroxine (SYNTHROID/LEVOTHROID) 112 MCG tablet [Pharmacy Med Name: LEVOTHYROXINE 112 MCG TABLET] 30 tablet 0     Sig: TAKE 1 TABLET (112 MCG) BY MOUTH DAILY       Thyroid Protocol Failed - 9/9/2019  7:23 PM        Failed - Recent (12 mo) or future (30 days) visit within the authorizing provider's specialty     Patient had office visit in the last 12 months or has a visit in the next 30 days with authorizing provider or within the authorizing provider's specialty.  See \"Patient Info\" tab in inbasket, or \"Choose Columns\" in Meds & Orders section of the refill encounter.              Failed - Normal TSH on file in past 12 months     Recent Labs   Lab Test 08/02/17  0907   TSH 1.90              Passed - Patient is 12 years or older        Passed - Medication is active on med list        Passed - No active pregnancy on record     If patient is pregnant or has had a positive pregnancy test, please check TSH.          Passed - No positive pregnancy test in past 12 months     If patient is pregnant or has had a positive pregnancy test, please check TSH.          Routing refill request to provider for review/approval because:  Twyla given x1 and patient did not follow up, please advise  Labs out of range:  tsh  Labs not current:  tsh        "

## 2020-02-11 ENCOUNTER — OFFICE VISIT (OUTPATIENT)
Dept: INTERNAL MEDICINE | Facility: CLINIC | Age: 50
End: 2020-02-11
Payer: COMMERCIAL

## 2020-02-11 VITALS
DIASTOLIC BLOOD PRESSURE: 80 MMHG | RESPIRATION RATE: 15 BRPM | WEIGHT: 196.7 LBS | OXYGEN SATURATION: 97 % | BODY MASS INDEX: 33.58 KG/M2 | TEMPERATURE: 98.4 F | HEIGHT: 64 IN | SYSTOLIC BLOOD PRESSURE: 130 MMHG | HEART RATE: 78 BPM

## 2020-02-11 DIAGNOSIS — Z12.11 COLON CANCER SCREENING: ICD-10-CM

## 2020-02-11 DIAGNOSIS — E03.9 ACQUIRED HYPOTHYROIDISM: ICD-10-CM

## 2020-02-11 DIAGNOSIS — Z13.6 CARDIOVASCULAR SCREENING; LDL GOAL LESS THAN 160: ICD-10-CM

## 2020-02-11 DIAGNOSIS — Z11.4 SCREENING FOR HUMAN IMMUNODEFICIENCY VIRUS WITHOUT PRESENCE OF RISK FACTORS: ICD-10-CM

## 2020-02-11 DIAGNOSIS — Z00.00 ROUTINE GENERAL MEDICAL EXAMINATION AT A HEALTH CARE FACILITY: Primary | ICD-10-CM

## 2020-02-11 DIAGNOSIS — Z12.31 VISIT FOR SCREENING MAMMOGRAM: ICD-10-CM

## 2020-02-11 DIAGNOSIS — F31.70 BIPOLAR AFFECTIVE DISORDER IN REMISSION (H): ICD-10-CM

## 2020-02-11 DIAGNOSIS — Z72.0 TOBACCO ABUSE: ICD-10-CM

## 2020-02-11 LAB
ALBUMIN SERPL-MCNC: 4.1 G/DL (ref 3.4–5)
ALP SERPL-CCNC: 89 U/L (ref 40–150)
ALT SERPL W P-5'-P-CCNC: 102 U/L (ref 0–50)
ANION GAP SERPL CALCULATED.3IONS-SCNC: 8 MMOL/L (ref 3–14)
AST SERPL W P-5'-P-CCNC: 88 U/L (ref 0–45)
BILIRUB SERPL-MCNC: 0.4 MG/DL (ref 0.2–1.3)
BUN SERPL-MCNC: 2 MG/DL (ref 7–30)
CALCIUM SERPL-MCNC: 8.4 MG/DL (ref 8.5–10.1)
CHLORIDE SERPL-SCNC: 110 MMOL/L (ref 94–109)
CHOLEST SERPL-MCNC: 200 MG/DL
CO2 SERPL-SCNC: 24 MMOL/L (ref 20–32)
CREAT SERPL-MCNC: 0.82 MG/DL (ref 0.52–1.04)
GFR SERPL CREATININE-BSD FRML MDRD: 83 ML/MIN/{1.73_M2}
GLUCOSE SERPL-MCNC: 121 MG/DL (ref 70–99)
HDLC SERPL-MCNC: 60 MG/DL
HGB BLD-MCNC: 14 G/DL (ref 11.7–15.7)
LDLC SERPL CALC-MCNC: 115 MG/DL
NONHDLC SERPL-MCNC: 140 MG/DL
POTASSIUM SERPL-SCNC: 3.8 MMOL/L (ref 3.4–5.3)
PROT SERPL-MCNC: 7.5 G/DL (ref 6.8–8.8)
SODIUM SERPL-SCNC: 142 MMOL/L (ref 133–144)
T4 FREE SERPL-MCNC: 0.53 NG/DL (ref 0.76–1.46)
TRIGL SERPL-MCNC: 125 MG/DL
TSH SERPL DL<=0.005 MIU/L-ACNC: 51.99 MU/L (ref 0.4–4)

## 2020-02-11 PROCEDURE — 36415 COLL VENOUS BLD VENIPUNCTURE: CPT | Performed by: INTERNAL MEDICINE

## 2020-02-11 PROCEDURE — 84443 ASSAY THYROID STIM HORMONE: CPT | Performed by: INTERNAL MEDICINE

## 2020-02-11 PROCEDURE — 99396 PREV VISIT EST AGE 40-64: CPT | Performed by: INTERNAL MEDICINE

## 2020-02-11 PROCEDURE — 87389 HIV-1 AG W/HIV-1&-2 AB AG IA: CPT | Performed by: INTERNAL MEDICINE

## 2020-02-11 PROCEDURE — 85018 HEMOGLOBIN: CPT | Performed by: INTERNAL MEDICINE

## 2020-02-11 PROCEDURE — 80061 LIPID PANEL: CPT | Performed by: INTERNAL MEDICINE

## 2020-02-11 PROCEDURE — 80053 COMPREHEN METABOLIC PANEL: CPT | Performed by: INTERNAL MEDICINE

## 2020-02-11 PROCEDURE — 84439 ASSAY OF FREE THYROXINE: CPT | Performed by: INTERNAL MEDICINE

## 2020-02-11 RX ORDER — LEVOTHYROXINE SODIUM 112 UG/1
112 TABLET ORAL DAILY
Qty: 90 TABLET | Refills: 1 | Status: SHIPPED | OUTPATIENT
Start: 2020-02-11 | End: 2021-04-28

## 2020-02-11 ASSESSMENT — PATIENT HEALTH QUESTIONNAIRE - PHQ9
SUM OF ALL RESPONSES TO PHQ QUESTIONS 1-9: 14
SUM OF ALL RESPONSES TO PHQ QUESTIONS 1-9: 14
10. IF YOU CHECKED OFF ANY PROBLEMS, HOW DIFFICULT HAVE THESE PROBLEMS MADE IT FOR YOU TO DO YOUR WORK, TAKE CARE OF THINGS AT HOME, OR GET ALONG WITH OTHER PEOPLE: VERY DIFFICULT

## 2020-02-11 ASSESSMENT — MIFFLIN-ST. JEOR: SCORE: 1497.23

## 2020-02-11 NOTE — PROGRESS NOTES
SUBJECTIVE:   CC: Eve Byrd is an 49 year old woman who presents for preventive health visit.     Healthy Habits:      Answers for HPI/ROS submitted by the patient on 2/11/2020   Annual Exam:  Frequency of exercise:: None  Getting at least 3 servings of Calcium per day:: NO  Diet:: Regular (no restrictions)  Taking medications regularly:: Yes  Medication side effects:: None  Bi-annual eye exam:: Yes  Dental care twice a year:: Yes  Sleep apnea or symptoms of sleep apnea:: None  Additional concerns today:: No  If you checked off any problems, how difficult have these problems made it for you to do your work, take care of things at home, or get along with other people?: Very difficult  PHQ9 TOTAL SCORE: 14     PROBLEMS TO ADD ON...  1. Patient working with [facility] (Dr. Eulogio Fisher). Patient will be getting ECT done for bipolar disorder in the near future and needs clearance from PCP.     Today's PHQ-2 Score:   PHQ-2 ( 1999 [employer]) 2/11/2020 12/6/2017   Q1: Little interest or pleasure in doing things - 0   Q2: Feeling down, depressed or hopeless - 0   PHQ-2 Score - 0   Q1: Little interest or pleasure in doing things More than half the days Not at all   Q2: Feeling down, depressed or hopeless More than half the days Not at all   PHQ-2 Score 4 0       Abuse: Current or Past(Physical, Sexual or Emotional)- No  Do you feel safe in your environment? Yes      Social History     Tobacco Use     Smoking status: Former Smoker     Years: 30.00     Types: Cigarettes     Smokeless tobacco: Former User     Quit date: 3/31/2017   Substance Use Topics     Alcohol use: Yes     Alcohol/week: 0.0 standard drinks     Comment: Socially      If you drink alcohol do you typically have >3 drinks per day or >7 drinks per week? No                     Reviewed orders with patient.  Reviewed health maintenance and updated orders accordingly - Yes      Mammogram Screening: Patient over age 50, mutual decision to  "screen reflected in health maintenance.    Pertinent mammograms are reviewed under the imaging tab.  History of abnormal Pap smear: NO - age 30-65 PAP every 5 years with negative HPV co-testing recommended  PAP / HPV Latest Ref Rng & Units 11/15/2016   PAP - NIL   HPV 16 DNA NEG Negative   HPV 18 DNA NEG Negative   OTHER HR HPV NEG Negative     Reviewed and updated as needed this visit by clinical staff         Reviewed and updated as needed this visit by Provider         ROS:  CONSTITUTIONAL: NEGATIVE for fever, chills, change in weight  INTEGUMENTARU/SKIN: NEGATIVE for worrisome rashes, moles or lesions  EYES: NEGATIVE for vision changes or irritation  ENT: NEGATIVE for ear, mouth and throat problems  RESP: NEGATIVE for significant cough or SOB  BREAST: NEGATIVE for masses, tenderness or discharge  CV: NEGATIVE for chest pain, palpitations or peripheral edema  GI: NEGATIVE for nausea, abdominal pain, heartburn, or change in bowel habits  : NEGATIVE for unusual urinary or vaginal symptoms. Periods are regular.  MUSCULOSKELETAL: NEGATIVE for significant arthralgias or myalgia  NEURO: NEGATIVE for weakness, dizziness or paresthesias  PSYCHIATRIC: NEGATIVE for changes in mood or affect    OBJECTIVE:   /80   Pulse 78   Temp 98.4  F (36.9  C) (Oral)   Resp 15   Ht 1.626 m (5' 4\")   Wt 89.2 kg (196 lb 11.2 oz)   LMP 04/12/2017   SpO2 97%   Breastfeeding No   BMI 33.76 kg/m    EXAM:  GENERAL: alert and no distress  EYES: Eyes grossly normal to inspection, PERRL and conjunctivae and sclerae normal  HENT: ear canals and TM's normal, nose and mouth without ulcers or lesions  NECK: no adenopathy, no asymmetry, masses, or scars and thyroid normal to palpation  RESP: lungs clear to auscultation - no rales, rhonchi or wheezes  BREAST: declined per patient  CV: regular rate and rhythm, normal S1 S2, no S3 or S4, no murmur, click or rub, no peripheral edema and peripheral pulses strong  ABDOMEN: soft, nontender, " no hepatosplenomegaly, no masses and bowel sounds normal  MS: no gross musculoskeletal defects noted, no edema  NEURO: Normal strength and tone, mentation intact and speech normal  PSYCH: mentation appears normal, affect normal/bright    ASSESSMENT/PLAN:   1. Routine general medical examination at a health care facility  Advised shingles vaccination, mammogram and colonoscopy.  - Hemoglobin  - Comprehensive metabolic panel  - Lipid Profile    2. Acquired hypothyroidism  Patient informs me that she has not taken her thyroid medicine for more than 6 months.  We will recheck her thyroid function tests and await the results and restarting her medication.  - TSH with free T4 reflex  - levothyroxine (SYNTHROID/LEVOTHROID) 112 MCG tablet; Take 1 tablet (112 mcg) by mouth daily  Dispense: 90 tablet; Refill: 1    3. CARDIOVASCULAR SCREENING; LDL GOAL LESS THAN 160  Labs ordered as fasting per routine.  - Lipid Profile    4. Bipolar affective disorder in remission (H)  Patient is following per her mental health provider and will tentatively be scheduling ECT therapy for which a preoperative assessment has been recommended.    5. Colon cancer screening  ADVISED COLONOSCOPY FOR ROUTINE PREVENTATIVE CARE.  - GASTROENTEROLOGY ADULT REF PROCEDURE ONLY Margaret Alejo [phone]; No Provider Preference    6. Visit for screening mammogram  As routine screening.  Patient declined rest exam and she was advised to follow through and do this monthly  - *MA Screening Digital Bilateral; Future    7. Tobacco abuse  Smoking cessation noted and continued smoking cessation was advised    8. Screening for human immunodeficiency virus without presence of risk factors  Offered his routine screening based on age  - HIV Antigen Antibody Combo    COUNSELING:   Reviewed preventive health counseling, as reflected in patient instructions       Regular exercise       Healthy diet/nutrition    Estimated body mass index is 34.02 kg/m  as  "calculated from the following:    Height as of 1/3/19: 1.626 m (5' 4\").    Weight as of 7/17/19: 89.9 kg (198 lb 3 oz).       reports that she has quit smoking. Her smoking use included cigarettes. She quit after 30.00 years of use. She quit smokeless tobacco use about 2 years ago.    Counseling Resources:  ATP IV Guidelines  Pooled Cohorts Equation Calculator  Breast Cancer Risk Calculator  FRAX Risk Assessment  ICSI Preventive Guidelines  Dietary Guidelines for Americans, 2010  USDA's MyPlate  ASA Prophylaxis  Lung CA Screening    Prieto Vieira MD  [facility]      "

## 2020-02-11 NOTE — LETTER
[facility]  [address] [address]  [phone]      2/12/2020       Eve Byrd  [address] [place] [id number]        Dear Eve,    Your hemoglobin is normal.    Your blood sugar function tests are slightly abnormal and elevated and should be rechecked here in the clinic in 3 months with a follow-up visit with me, fasting.  I will look forward to seeing you at that time and please call to make an appointment.  In the meantime, please work on your diet limiting your carbohydrates and getting some good, regular exercise.    Your cholesterol is high and could be treated more aggressively with better diet, exercise and weight loss.  Please follow-up with me to discuss your further medication options/changes if you are interested.    Your thyroid function tests indicate you need some medication adjustment so I would call the clinic and make sure that you start your thyroid medicine as we discussed and then repeat your thyroid levels in 3 months for comparison.    Your liver function tests are also slightly abnormal and should be rechecked here in the clinic in one month with a follow-up visit with me.  I will look forward to seeing you at that time and please call to make an appointment.    Your HIV study takes a few extra days to come back so I did not include that result within this letter but I will call you if the result is abnormal.        Sincerely,      Prieto Vieira MD  Internal Medicine

## 2020-02-12 LAB — HIV 1+2 AB+HIV1 P24 AG SERPL QL IA: NONREACTIVE

## 2020-02-12 ASSESSMENT — ASTHMA QUESTIONNAIRES: ACT_TOTALSCORE: 24

## 2020-02-13 ENCOUNTER — OFFICE VISIT (OUTPATIENT)
Dept: INTERNAL MEDICINE | Facility: CLINIC | Age: 50
End: 2020-02-13
Payer: COMMERCIAL

## 2020-02-13 VITALS
OXYGEN SATURATION: 97 % | RESPIRATION RATE: 15 BRPM | HEART RATE: 81 BPM | BODY MASS INDEX: 33.64 KG/M2 | DIASTOLIC BLOOD PRESSURE: 82 MMHG | WEIGHT: 196 LBS | TEMPERATURE: 97.6 F | SYSTOLIC BLOOD PRESSURE: 114 MMHG

## 2020-02-13 DIAGNOSIS — J45.20 MILD INTERMITTENT ASTHMA, UNSPECIFIED WHETHER COMPLICATED: ICD-10-CM

## 2020-02-13 DIAGNOSIS — F31.70 BIPOLAR AFFECTIVE DISORDER IN REMISSION (H): ICD-10-CM

## 2020-02-13 DIAGNOSIS — R94.5 ABNORMAL RESULTS OF LIVER FUNCTION STUDIES: ICD-10-CM

## 2020-02-13 DIAGNOSIS — Z01.818 PREOP GENERAL PHYSICAL EXAM: Primary | ICD-10-CM

## 2020-02-13 DIAGNOSIS — E03.9 ACQUIRED HYPOTHYROIDISM: ICD-10-CM

## 2020-02-13 PROCEDURE — 93000 ELECTROCARDIOGRAM COMPLETE: CPT | Performed by: INTERNAL MEDICINE

## 2020-02-13 PROCEDURE — 99214 OFFICE O/P EST MOD 30 MIN: CPT | Performed by: INTERNAL MEDICINE

## 2020-02-13 RX ORDER — VILAZODONE HYDROCHLORIDE 40 MG/1
40 TABLET ORAL DAILY
COMMUNITY
Start: 2020-02-10 | End: 2022-02-07

## 2020-02-13 NOTE — PROGRESS NOTES
[facility]  [address] [address]  [phone]  Dept: [phone]    PRE-OP EVALUATION:  Today's date: 2020    Eve Byrd (: 1970) presents for pre-operative evaluation assessment as requested by Dr. Fisher.  She requires evaluation and anesthesia risk assessment prior to undergoing surgery/procedure for treatment of Bipolar disease.      Patient will be getting ECT done for bipolar disorder and needs clearance from PCP.      Proposed Surgery/ Procedure: ECT therapy  Date of Surgery/ Procedure: 20  Time of Surgery/ Procedure: 6:00 am  Hospital/Surgical Facility: [facility]   Fax number for surgical facility: [phone]   Primary Physician: Prieto Vieira  Type of Anesthesia Anticipated: General    Patient has a Health Care Directive or Living Will:  NO      HPI:     HPI related to upcoming procedure: Patient has been seen and evaluated through mental health and deemed to be a candidate for ECT therapy.  She is here today for preoperative assessment.    See problem list for active medical problems.  Problems all longstanding and stable, except as noted/documented.  See ROS for pertinent symptoms related to these conditions.    MEDICAL HISTORY:     Patient Active Problem List    Diagnosis Date Noted     CARDIOVASCULAR SCREENING; LDL GOAL LESS THAN 160 2017     Priority: Medium     Mild intermittent asthma, unspecified whether complicated 2017     Priority: Medium     Acquired hypothyroidism 2017     Priority: Medium     Bipolar affective disorder in remission (H) 2016     Priority: Medium     Tobacco abuse 2016     Priority: Medium     Cholelithiasis 2014     Priority: Medium     Cholecystitis 2014     Priority: Medium     Acute respiratory failure (H) 2013     Priority: Medium     Muscle weakness (generalized) 2010     Priority: Medium      Past Medical History:   Diagnosis Date      Asthma      Bipolar disorder (H)      Hypothyroidism 1/6/2014     Thyroid disease      Past Surgical History:   Procedure Laterality Date     LAPAROSCOPIC CHOLECYSTECTOMY  6/24/2014    Procedure: LAPAROSCOPIC CHOLECYSTECTOMY;  Surgeon: Maged Mccabe MD;  Location:  OR     Current Outpatient Medications   Medication Sig Dispense Refill     albuterol (PROAIR HFA, PROVENTIL HFA, VENTOLIN HFA) 108 (90 BASE) MCG/ACT inhaler Inhale 2 puffs into the lungs every 4 hours as needed 1 Inhaler 11     lamoTRIgine (LAMICTAL) 25 MG tablet 1 po BID 60 tablet 0     levothyroxine (SYNTHROID/LEVOTHROID) 112 MCG tablet Take 1 tablet (112 mcg) by mouth daily 90 tablet 1     Lurasidone HCl (LATUDA PO) Take by mouth daily       montelukast (SINGULAIR) 10 MG tablet Take 1 tablet (10 mg) by mouth At Bedtime (Patient not taking: Reported on 2/11/2020) 30 tablet 0     OTC products: None, except as noted above    Allergies   Allergen Reactions     Cats      No Known Drug Allergy       Latex Allergy: NO    Social History     Tobacco Use     Smoking status: Former Smoker     Years: 30.00     Types: Cigarettes     Smokeless tobacco: Former User     Quit date: 3/31/2017   Substance Use Topics     Alcohol use: Yes     Alcohol/week: 0.0 standard drinks     Comment: Socially      History   Drug Use No       REVIEW OF SYSTEMS:   CONSTITUTIONAL: NEGATIVE for fever, chills, change in weight  EYES: NEGATIVE for vision changes or irritation  ENT/MOUTH: NEGATIVE for ear, mouth and throat problems  RESP: NEGATIVE for significant cough or SOB  CV: NEGATIVE for chest pain, palpitations or peripheral edema  GI: NEGATIVE for nausea, abdominal pain, heartburn, or change in bowel habits  : NEGATIVE for frequency, dysuria, or hematuria  MUSCULOSKELETAL: NEGATIVE for significant arthralgias or myalgia  NEURO: NEGATIVE for weakness, dizziness or paresthesias  HEME: NEGATIVE for bleeding problems    EXAM:   /82   Pulse 81   Temp 97.6  F (36.4  C)  (Oral)   Resp 15   Wt 88.9 kg (196 lb)   LMP 04/12/2017   SpO2 97%   BMI 33.64 kg/m      GENERAL APPEARANCE:  alert and no distress     EYES: EOMI, PERRL     HENT: ear canals and TM's normal and nose and mouth without ulcers or lesions     NECK: no adenopathy, no asymmetry, masses, or scars and thyroid normal to palpation     RESP: lungs clear to auscultation - no rales, rhonchi or wheezes     CV: regular rates and rhythm, normal S1 S2, no S3 or S4 and no murmur, click or rub     MS: extremities normal- no gross deformities noted     NEURO: No focal changes     PSYCH: mentation appears normal. and affect normal/bright    DIAGNOSTICS:   EKG: Normal Sinus Rhythm @ 79 , nonspecific ST-T changes with erratic baseline.  Slightly poor R wave progression noted through leads V1 and V2 which does not appear to be appreciably changed from June 2014 EKG.    Labs Resulted Today: No results found for any visits on 02/13/20.    Recent Labs   Lab Test 02/11/20  1609 01/03/19 2000 06/24/14  1439   HGB 14.0 14.3   < > 13.4   PLT  --  179  --  222    139   < > 138   POTASSIUM 3.8 3.9   < > 4.3   CR 0.82 1.07*   < > 0.71    < > = values in this interval not displayed.      IMPRESSION:   Reason for surgery/procedure: Patient will be getting ECT done for bipolar disorder and needs clearance from PCP.     The proposed surgical procedure is considered mild risk.    REVISED CARDIAC RISK INDEX  The patient has the following serious cardiovascular risks for perioperative complications such as (MI, PE, VFib and 3  AV Block):  No serious cardiac risks  INTERPRETATION: 1 risks: Class II (low risk - 0.9% complication rate)    The patient has the following additional risks for perioperative complications:  No identified additional risks      ICD-10-CM    1. Preop general physical exam Z01.818 EKG 12-lead complete w/read - Clinics   2. Bipolar affective disorder in remission (H) F31.70    3. Mild intermittent asthma, unspecified  whether complicated J45.20 Asthma Action Plan (AAP)   4. Acquired hypothyroidism E03.9 TSH with free T4 reflex   5. Abnormal results of liver function studies R94.5 Hepatic panel     US Abdomen Complete       RECOMMENDATIONS:     --Consult hospital rounder / IM to assist post-op medical management    Cardiovascular Risk  Performs 4 METs exercise without symptoms (Light housework (dusting, washing dishes), Climb a flight of stairs and Walk on level ground at 15 minutes per mile (4 miles/hour)) .     --Patient is to take all scheduled medications on the day of surgery EXCEPT for modifications listed below.    Anticoagulant or Antiplatelet Medication Use  Bleeding risk is low for this procedure.  Advised patient for reassurance would consider discontinuation of all anti-inflammatories, anticoagulants or over-the-counter products the surgical procedure.      APPROVAL GIVEN to proceed with proposed procedure, without further diagnostic evaluation the patient was advised to follow-up afterwards for diagnostic studies to evaluate elevated liver function profile as well as thyroid function testing.       Signed Electronically by: Prieto Vieira MD    Copy of this evaluation report is provided to requesting physician, Dr. Phillip Herndon Preop Guidelines    Revised Cardiac Risk Index

## 2020-02-21 ENCOUNTER — HOSPITAL ENCOUNTER (OUTPATIENT)
Dept: SURGERY | Facility: CLINIC | Age: 50
Discharge: HOME | End: 2020-02-21
Admitting: PSYCHIATRY & NEUROLOGY
Payer: COMMERCIAL

## 2020-02-21 ENCOUNTER — ANESTHESIA (OUTPATIENT)
Dept: SURGERY | Facility: CLINIC | Age: 50
End: 2020-02-21

## 2020-02-21 ENCOUNTER — ANESTHESIA EVENT (OUTPATIENT)
Dept: SURGERY | Facility: CLINIC | Age: 50
End: 2020-02-21

## 2020-02-21 VITALS
TEMPERATURE: 98 F | DIASTOLIC BLOOD PRESSURE: 90 MMHG | HEART RATE: 70 BPM | OXYGEN SATURATION: 96 % | SYSTOLIC BLOOD PRESSURE: 137 MMHG | RESPIRATION RATE: 20 BRPM

## 2020-02-21 DIAGNOSIS — F33.2 SEVERE RECURRENT MAJOR DEPRESSION WITHOUT PSYCHOTIC FEATURES (H): Primary | ICD-10-CM

## 2020-02-21 PROCEDURE — 25800030 ZZH RX IP 258 OP 636: Performed by: ANESTHESIOLOGY

## 2020-02-21 PROCEDURE — 25000128 H RX IP 250 OP 636: Performed by: NURSE ANESTHETIST, CERTIFIED REGISTERED

## 2020-02-21 PROCEDURE — 90870 ELECTROCONVULSIVE THERAPY: CPT

## 2020-02-21 PROCEDURE — 25000566 ZZH SEVOFLURANE, EA 15 MIN

## 2020-02-21 PROCEDURE — 25000125 ZZHC RX 250: Performed by: NURSE ANESTHETIST, CERTIFIED REGISTERED

## 2020-02-21 PROCEDURE — 37000008 ZZH ANESTHESIA TECHNICAL FEE, 1ST 30 MIN

## 2020-02-21 PROCEDURE — 40000010 ZZH STATISTIC ANES STAT CODE-CRNA PER MINUTE

## 2020-02-21 RX ORDER — SODIUM CHLORIDE, SODIUM LACTATE, POTASSIUM CHLORIDE, CALCIUM CHLORIDE 600; 310; 30; 20 MG/100ML; MG/100ML; MG/100ML; MG/100ML
500 INJECTION, SOLUTION INTRAVENOUS CONTINUOUS
Status: DISCONTINUED | OUTPATIENT
Start: 2020-02-21 | End: 2020-02-22 | Stop reason: HOSPADM

## 2020-02-21 RX ADMIN — SUCCINYLCHOLINE CHLORIDE 100 MG: 20 INJECTION, SOLUTION INTRAMUSCULAR; INTRAVENOUS; PARENTERAL at 06:52

## 2020-02-21 RX ADMIN — METHOHEXITAL SODIUM 100 MG: 500 INJECTION, POWDER, LYOPHILIZED, FOR SOLUTION INTRAMUSCULAR; INTRAVENOUS; RECTAL at 06:52

## 2020-02-21 RX ADMIN — SODIUM CHLORIDE, POTASSIUM CHLORIDE, SODIUM LACTATE AND CALCIUM CHLORIDE 500 ML: 600; 310; 30; 20 INJECTION, SOLUTION INTRAVENOUS at 06:47

## 2020-02-21 NOTE — PROCEDURES
[facility] ECT Procedure Note     Eve Byrd [id number]   49 year old 1970     Patient Status: Outpatient    Allergies   Allergen Reactions     Cats      No Known Drug Allergy        Weight:  0 lbs 0 oz              Diagnosis:   Major depression       Indications for ECT:   Medications ineffective       Pause for the Cause:     Right patient Yes   Right procedure/laterality settings: Yes   Right diagnosis Yes          Intra-Procedure Documentation:     Date:  2/21/2020  Time:  6:49 AM    ECT #    Treatment number this series: 1   Total treatment number: 1   Type of ECT:  Bilateral, standard    ECT Medications administered: See MAR and Anesthesia record         Clinical Narrative:     ECT was administered by Thymatron machine.  Pt has been medically cleared for procedure, consent signed. Side effects, Risks and benefits reviewed.    ECT Strip Summary:   Energy Level: 50 percent  Motor Seizure Duration: 35 seconds  EEG Seizure Duration: 35 seconds    Complications: No    Plan: 2nd ECT 2/24/20  Outpatient Psychiatrist:Dr Cooper

## 2020-02-21 NOTE — ANESTHESIA PREPROCEDURE EVALUATION
Anesthesia Pre-Procedure Evaluation    Patient: Eve Byrd   MRN: [id number] : 1970          Preoperative Diagnosis: * No surgery found *        Past Medical History:   Diagnosis Date     Asthma      Bipolar disorder (H)      Hypothyroidism 2014     Thyroid disease      Past Surgical History:   Procedure Laterality Date     LAPAROSCOPIC CHOLECYSTECTOMY  2014    Procedure: LAPAROSCOPIC CHOLECYSTECTOMY;  Surgeon: Maged Mccabe MD;  Location:  OR       Anesthesia Evaluation     .             ROS/MED HX    ENT/Pulmonary:     (+)Intermittent asthma (rare need for albuterol (last >6mo)) , . .    Neurologic:       Cardiovascular:         METS/Exercise Tolerance:  >4 METS   Hematologic:         Musculoskeletal:         GI/Hepatic:         Renal/Genitourinary:         Endo:     (+) thyroid problem hypothyroidism, Obesity, .      Psychiatric:     (+) psychiatric history bipolar and depression      Infectious Disease:         Malignancy:         Other:                          Physical Exam  Normal systems: dental    Airway   Mallampati: II  TM distance: >3 FB  Neck ROM: full    Dental     Cardiovascular   Rhythm and rate: regular      Pulmonary             Lab Results   Component Value Date    WBC 3.5 (L) 2019    HGB 14.0 2020    HCT 41.3 2019     2019     2020    POTASSIUM 3.8 2020    CHLORIDE 110 (H) 2020    CO2 24 2020    BUN 2 (L) 2020    CR 0.82 2020     (H) 2020    SALVATORE 8.4 (L) 2020    PHOS 2.9 2010    MAG 2.3 2010    ALBUMIN 4.1 2020    PROTTOTAL 7.5 2020     (H) 2020    AST 88 (H) 2020    ALKPHOS 89 2020    BILITOTAL 0.4 2020    LIPASE 170 2019    AMYLASE 34 2010    PTT 26 2010    INR 0.94 2010    FIBR 378 2010    TSH 51.99 (H) 2020    T4 0.53 (L) 2020    HCG Negative 2015    HCGS Negative  "04/09/2010       Preop Vitals  BP Readings from Last 3 Encounters:   02/21/20 (!) 150/99   02/13/20 114/82   02/11/20 130/80    Pulse Readings from Last 3 Encounters:   02/21/20 70   02/13/20 81   02/11/20 78      Resp Readings from Last 3 Encounters:   02/21/20 18   02/13/20 15   02/11/20 15    SpO2 Readings from Last 3 Encounters:   02/21/20 96%   02/13/20 97%   02/11/20 97%      Temp Readings from Last 1 Encounters:   02/21/20 36.7  C (98  F) (Temporal)    Ht Readings from Last 1 Encounters:   02/11/20 1.626 m (5' 4\")      Wt Readings from Last 1 Encounters:   02/13/20 88.9 kg (196 lb)    Estimated body mass index is 33.64 kg/m  as calculated from the following:    Height as of 2/11/20: 1.626 m (5' 4\").    Weight as of 2/13/20: 88.9 kg (196 lb).       Anesthesia Plan      History & Physical Review  History and physical reviewed and following examination; no interval change.    ASA Status:  2 .    NPO Status:  > 8 hours    Plan for General (mask GA) with Intravenous (methohexital) induction.          Postoperative Care      Consents  Anesthetic plan, risks, benefits and alternatives discussed with:  Patient..                 Isaac Mandujano MD  "

## 2020-02-21 NOTE — ANESTHESIA POSTPROCEDURE EVALUATION
Patient: Eve Byrd    * No procedures listed *    Diagnosis:* No pre-op diagnosis entered *  Diagnosis Additional Information: No value filed.    Anesthesia Type:  General    Note:  Anesthesia Post Evaluation    Patient location during evaluation: PACU  Patient participation: Able to fully participate in evaluation  Level of consciousness: awake and alert  Pain management: adequate  Airway patency: patent  Cardiovascular status: acceptable and hemodynamically stable  Respiratory status: acceptable and room air  Hydration status: euvolemic  PONV: none     Anesthetic complications: None          Last vitals:  Vitals:    02/21/20 0715 02/21/20 0720 02/21/20 0725   BP: (!) 140/89 (!) 131/90 (!) 149/88   Pulse: 75 75 72   Resp: 13 12 12   Temp:      SpO2: 99% 97% 97%         Electronically Signed By: Isaac Mandujano MD  February 21, 2020  7:29 AM

## 2020-02-24 ENCOUNTER — HOSPITAL ENCOUNTER (OUTPATIENT)
Dept: SURGERY | Facility: CLINIC | Age: 50
Discharge: HOME | End: 2020-02-24
Attending: PSYCHIATRY & NEUROLOGY | Admitting: PSYCHIATRY & NEUROLOGY
Payer: COMMERCIAL

## 2020-02-24 ENCOUNTER — ANESTHESIA (OUTPATIENT)
Dept: SURGERY | Facility: CLINIC | Age: 50
End: 2020-02-24

## 2020-02-24 ENCOUNTER — ANESTHESIA EVENT (OUTPATIENT)
Dept: SURGERY | Facility: CLINIC | Age: 50
End: 2020-02-24

## 2020-02-24 VITALS
SYSTOLIC BLOOD PRESSURE: 140 MMHG | DIASTOLIC BLOOD PRESSURE: 94 MMHG | TEMPERATURE: 98.4 F | RESPIRATION RATE: 16 BRPM | OXYGEN SATURATION: 95 % | HEART RATE: 65 BPM

## 2020-02-24 PROCEDURE — 25800030 ZZH RX IP 258 OP 636: Performed by: ANESTHESIOLOGY

## 2020-02-24 PROCEDURE — 40000010 ZZH STATISTIC ANES STAT CODE-CRNA PER MINUTE

## 2020-02-24 PROCEDURE — 37000008 ZZH ANESTHESIA TECHNICAL FEE, 1ST 30 MIN

## 2020-02-24 PROCEDURE — 90870 ELECTROCONVULSIVE THERAPY: CPT

## 2020-02-24 PROCEDURE — 25000128 H RX IP 250 OP 636: Performed by: NURSE ANESTHETIST, CERTIFIED REGISTERED

## 2020-02-24 PROCEDURE — 25000566 ZZH SEVOFLURANE, EA 15 MIN

## 2020-02-24 PROCEDURE — 25000125 ZZHC RX 250: Performed by: ANESTHESIOLOGY

## 2020-02-24 PROCEDURE — 25000125 ZZHC RX 250: Performed by: NURSE ANESTHETIST, CERTIFIED REGISTERED

## 2020-02-24 RX ORDER — SODIUM CHLORIDE, SODIUM LACTATE, POTASSIUM CHLORIDE, CALCIUM CHLORIDE 600; 310; 30; 20 MG/100ML; MG/100ML; MG/100ML; MG/100ML
500 INJECTION, SOLUTION INTRAVENOUS CONTINUOUS
Status: DISCONTINUED | OUTPATIENT
Start: 2020-02-24 | End: 2020-02-25 | Stop reason: HOSPADM

## 2020-02-24 RX ADMIN — LIDOCAINE HYDROCHLORIDE 0.2 ML: 10 INJECTION, SOLUTION EPIDURAL; INFILTRATION; INTRACAUDAL; PERINEURAL at 06:53

## 2020-02-24 RX ADMIN — METHOHEXITAL SODIUM 100 MG: 500 INJECTION, POWDER, LYOPHILIZED, FOR SOLUTION INTRAMUSCULAR; INTRAVENOUS; RECTAL at 07:10

## 2020-02-24 RX ADMIN — SODIUM CHLORIDE, POTASSIUM CHLORIDE, SODIUM LACTATE AND CALCIUM CHLORIDE 500 ML: 600; 310; 30; 20 INJECTION, SOLUTION INTRAVENOUS at 06:53

## 2020-02-24 RX ADMIN — SUCCINYLCHOLINE CHLORIDE 100 MG: 20 INJECTION, SOLUTION INTRAMUSCULAR; INTRAVENOUS; PARENTERAL at 07:10

## 2020-02-24 NOTE — ANESTHESIA POSTPROCEDURE EVALUATION
Patient: Eve Byrd    ECT    Diagnosis:* Major Depressive Disorder  Diagnosis Additional Information: No value filed.    Anesthesia Type:  General    Note:  Anesthesia Post Evaluation    Patient location during evaluation: PACU  Patient participation: Able to fully participate in evaluation  Level of consciousness: awake  Pain management: adequate  Airway patency: patent  Cardiovascular status: acceptable  Respiratory status: acceptable  Hydration status: acceptable  PONV: none             Last vitals:  Vitals:    02/24/20 0643   BP: (!) 156/93   Resp: 16   Temp: 36.5  C (97.7  F)   SpO2: 96%         Electronically Signed By: Nicanor Sanchez MD  February 24, 2020  7:22 AM

## 2020-02-24 NOTE — PROCEDURES
[facility] ECT Procedure Note     Eve Byrd [id number]   49 year old 1970     Patient Status: Outpatient    Allergies   Allergen Reactions     Cats      No Known Drug Allergy        Weight:  0 lbs 0 oz              Diagnosis:   Major depression       Indications for ECT:   Medications ineffective       Pause for the Cause:     Right patient Yes   Right procedure/laterality settings: Yes   Right diagnosis Yes          Intra-Procedure Documentation:     Date:  2/24/2020  Time:  6:49 AM    ECT #    Treatment number this series: 2   Total treatment number: 2   Type of ECT:  Bilateral, standard    ECT Medications administered: See MAR and Anesthesia record         Clinical Narrative:     ECT was administered by Thymatron machine.  Pt has been medically cleared for procedure, consent signed. Side effects, Risks and benefits reviewed.    ECT Strip Summary:   Energy Level: 55 percent  Motor Seizure Duration: 31 seconds  EEG Seizure Duration: 31 seconds    Complications: No    Plan: 3rd ECT 2/26/20  Outpatient Psychiatrist:Dr Allison Ordoñez MD

## 2020-02-24 NOTE — ANESTHESIA PREPROCEDURE EVALUATION
Anesthesia Pre-Procedure Evaluation    Patient: Eve Byrd   MRN: [id number] : 1970          Preoperative Diagnosis: * No pre-op diagnosis entered *    * No procedures listed *    Past Medical History:   Diagnosis Date     Asthma      Bipolar disorder (H)      Hypothyroidism 2014     Thyroid disease      Past Surgical History:   Procedure Laterality Date     LAPAROSCOPIC CHOLECYSTECTOMY  2014    Procedure: LAPAROSCOPIC CHOLECYSTECTOMY;  Surgeon: Maged Mccabe MD;  Location: [facility] OR       Anesthesia Evaluation     . Pt has had prior anesthetic. Type: General    No history of anesthetic complications          ROS/MED HX    ENT/Pulmonary:     (+)Intermittent asthma (rare need for albuterol (last >6mo)) , . .    Neurologic:       Cardiovascular:         METS/Exercise Tolerance:  >4 METS   Hematologic:         Musculoskeletal:         GI/Hepatic:         Renal/Genitourinary:         Endo:     (+) thyroid problem hypothyroidism, Obesity, .      Psychiatric:     (+) psychiatric history bipolar and depression      Infectious Disease:         Malignancy:         Other:                          Physical Exam  Normal systems: dental    Airway   Mallampati: II  TM distance: >3 FB  Neck ROM: full    Dental     Cardiovascular   Rhythm and rate: regular      Pulmonary             Lab Results   Component Value Date    WBC 3.5 (L) 2019    HGB 14.0 2020    HCT 41.3 2019     2019     2020    POTASSIUM 3.8 2020    CHLORIDE 110 (H) 2020    CO2 24 2020    BUN 2 (L) 2020    CR 0.82 2020     (H) 2020    SALVATORE 8.4 (L) 2020    PHOS 2.9 2010    MAG 2.3 2010    ALBUMIN 4.1 2020    PROTTOTAL 7.5 2020     (H) 2020    AST 88 (H) 2020    ALKPHOS 89 2020    BILITOTAL 0.4 2020    LIPASE 170 2019    AMYLASE 34 2010    PTT 26 2010    INR 0.94 2010    FIBR 378  "04/12/2010    TSH 51.99 (H) 02/11/2020    T4 0.53 (L) 02/11/2020    HCG Negative 04/12/2015    HCGS Negative 04/09/2010       Preop Vitals  BP Readings from Last 3 Encounters:   02/24/20 (!) 156/93   02/21/20 (!) 137/90   02/13/20 114/82    Pulse Readings from Last 3 Encounters:   02/21/20 70   02/13/20 81   02/11/20 78      Resp Readings from Last 3 Encounters:   02/24/20 16   02/21/20 20   02/13/20 15    SpO2 Readings from Last 3 Encounters:   02/24/20 96%   02/21/20 96%   02/13/20 97%      Temp Readings from Last 1 Encounters:   02/24/20 36.5  C (97.7  F) (Temporal)    Ht Readings from Last 1 Encounters:   02/11/20 1.626 m (5' 4\")      Wt Readings from Last 1 Encounters:   02/13/20 88.9 kg (196 lb)    Estimated body mass index is 33.64 kg/m  as calculated from the following:    Height as of 2/11/20: 1.626 m (5' 4\").    Weight as of 2/13/20: 88.9 kg (196 lb).       Anesthesia Plan      History & Physical Review  History and physical reviewed and following examination; no interval change.    ASA Status:  2 .    NPO Status:  > 8 hours    Plan for General (mask GA) with Intravenous (methohexital) induction.          Postoperative Care      Consents  Anesthetic plan, risks, benefits and alternatives discussed with:  Patient..                 Nicanor Sanchez MD  "

## 2020-02-24 NOTE — ANESTHESIA CARE TRANSFER NOTE
Patient: Eve Byrd    * No procedures listed *    Diagnosis: * No pre-op diagnosis entered *  Diagnosis Additional Information: No value filed.    Anesthesia Type:   General     Note:  Airway :Nasal Cannula  Patient transferred to:PACU  Comments: Native airway general anesthetic.  Patient hyperventilated with 100% oxygen via mask prior to treatment.   Anesthesia induced using patent peripheral IV.    Bite block placed between molars to protect teeth and tongue.     After induction of seizure patient mask ventilated with 100% oxygen until spontaneous respirations returned.     At time of handoff to PACU, patient exhibited spontaneous respirations, adequate tidal volumes, airway patent. Oxygen via nasal cannula at 4 liters per minute to PACU in cart with siderails up, connected to wall O2 in PACU. All monitors and alarms on and functioning. Report given to PACU RN and questions answered. Handoff Report: Identifed the Patient, Identified the Reponsible Provider, Reviewed the pertinent medical history, Discussed the surgical course, Reviewed Intra-OP anesthesia mangement and issues during anesthesia, Set expectations for post-procedure period and Allowed opportunity for questions and acknowledgement of understanding      Vitals: (Last set prior to Anesthesia Care Transfer)    CRNA VITALS  2/24/2020 0648 - 2/24/2020 0718      2/24/2020             Pulse:  93    SpO2:  100 %    Resp Rate (set):  10                Electronically Signed By: Christine Marie Volp Hodgkins, CRNA, APRN CRNA  February 24, 2020  7:18 AM

## 2020-02-26 ENCOUNTER — ANESTHESIA (OUTPATIENT)
Dept: SURGERY | Facility: CLINIC | Age: 50
End: 2020-02-26

## 2020-02-26 ENCOUNTER — HOSPITAL ENCOUNTER (OUTPATIENT)
Dept: SURGERY | Facility: CLINIC | Age: 50
Discharge: HOME | End: 2020-02-26
Attending: PSYCHIATRY & NEUROLOGY | Admitting: PSYCHIATRY & NEUROLOGY
Payer: COMMERCIAL

## 2020-02-26 ENCOUNTER — ANESTHESIA EVENT (OUTPATIENT)
Dept: SURGERY | Facility: CLINIC | Age: 50
End: 2020-02-26

## 2020-02-26 VITALS
DIASTOLIC BLOOD PRESSURE: 85 MMHG | SYSTOLIC BLOOD PRESSURE: 124 MMHG | RESPIRATION RATE: 15 BRPM | HEART RATE: 71 BPM | OXYGEN SATURATION: 93 % | TEMPERATURE: 97.8 F

## 2020-02-26 DIAGNOSIS — F33.2 SEVERE RECURRENT MAJOR DEPRESSION WITHOUT PSYCHOTIC FEATURES (H): Primary | ICD-10-CM

## 2020-02-26 PROCEDURE — 25800030 ZZH RX IP 258 OP 636: Performed by: ANESTHESIOLOGY

## 2020-02-26 PROCEDURE — 25000128 H RX IP 250 OP 636: Performed by: NURSE ANESTHETIST, CERTIFIED REGISTERED

## 2020-02-26 PROCEDURE — 25000125 ZZHC RX 250: Performed by: NURSE ANESTHETIST, CERTIFIED REGISTERED

## 2020-02-26 PROCEDURE — 37000008 ZZH ANESTHESIA TECHNICAL FEE, 1ST 30 MIN

## 2020-02-26 PROCEDURE — 40000010 ZZH STATISTIC ANES STAT CODE-CRNA PER MINUTE

## 2020-02-26 PROCEDURE — 90870 ELECTROCONVULSIVE THERAPY: CPT

## 2020-02-26 RX ORDER — SODIUM CHLORIDE, SODIUM LACTATE, POTASSIUM CHLORIDE, CALCIUM CHLORIDE 600; 310; 30; 20 MG/100ML; MG/100ML; MG/100ML; MG/100ML
INJECTION, SOLUTION INTRAVENOUS CONTINUOUS
Status: DISCONTINUED | OUTPATIENT
Start: 2020-02-26 | End: 2020-02-27 | Stop reason: HOSPADM

## 2020-02-26 RX ORDER — LABETALOL HYDROCHLORIDE 5 MG/ML
INJECTION, SOLUTION INTRAVENOUS PRN
Status: DISCONTINUED | OUTPATIENT
Start: 2020-02-26 | End: 2020-02-26

## 2020-02-26 RX ADMIN — LABETALOL HYDROCHLORIDE 10 MG: 5 INJECTION INTRAVENOUS at 07:03

## 2020-02-26 RX ADMIN — SUCCINYLCHOLINE CHLORIDE 100 MG: 20 INJECTION, SOLUTION INTRAMUSCULAR; INTRAVENOUS; PARENTERAL at 06:55

## 2020-02-26 RX ADMIN — METHOHEXITAL SODIUM 100 MG: 500 INJECTION, POWDER, LYOPHILIZED, FOR SOLUTION INTRAMUSCULAR; INTRAVENOUS; RECTAL at 06:54

## 2020-02-26 RX ADMIN — SODIUM CHLORIDE, POTASSIUM CHLORIDE, SODIUM LACTATE AND CALCIUM CHLORIDE: 600; 310; 30; 20 INJECTION, SOLUTION INTRAVENOUS at 06:15

## 2020-02-26 ASSESSMENT — LIFESTYLE VARIABLES: TOBACCO_USE: 1

## 2020-02-26 NOTE — ANESTHESIA CARE TRANSFER NOTE
Patient: Eve Byrd    * No procedures listed *    Diagnosis: * No pre-op diagnosis entered *  Diagnosis Additional Information: No value filed.    Anesthesia Type:   General     Note:  Airway :Nasal Cannula  Patient transferred to:PACU  Comments: Native airway general anesthetic.  Patient hyperventilated with 100% oxygen via mask prior to treatment.   Anesthesia induced using patent peripheral IV.    Bite block placed between molars to protect teeth and tongue.     After induction of seizure patient mask ventilated with 100% oxygen until spontaneous respirations returned.     At time of handoff to PACU, patient exhibited spontaneous respirations, adequate tidal volumes, airway patent. Oxygen via nasal cannula at 2 liters per minute to PACU in cart with siderails up, connected to wall O2 in PACU. All monitors and alarms on and functioning. Report given to PACU RN and questions answered.   Handoff Report: Identifed the Patient, Identified the Reponsible Provider, Reviewed the pertinent medical history, Discussed the surgical course, Reviewed Intra-OP anesthesia mangement and issues during anesthesia, Set expectations for post-procedure period and Allowed opportunity for questions and acknowledgement of understanding      Vitals: (Last set prior to Anesthesia Care Transfer)    CRNA VITALS  2/26/2020 0636 - 2/26/2020 0706      2/26/2020             Pulse:  80    SpO2:  100 %    Resp Rate (observed):  12    Resp Rate (set):  10                Electronically Signed By: MARK Borrero CRNA  February 26, 2020  7:06 AM

## 2020-02-26 NOTE — ANESTHESIA PREPROCEDURE EVALUATION
Anesthesia Pre-Procedure Evaluation    Patient: Eve Byrd   MRN: [id number] : 1970          Preoperative Diagnosis: * No surgery found *        Past Medical History:   Diagnosis Date     Asthma      Bipolar disorder (H)      Hypothyroidism 2014     Thyroid disease      Past Surgical History:   Procedure Laterality Date     LAPAROSCOPIC CHOLECYSTECTOMY  2014    Procedure: LAPAROSCOPIC CHOLECYSTECTOMY;  Surgeon: Maged Mccabe MD;  Location: [facility] OR       Anesthesia Evaluation     . Pt has had prior anesthetic. Type: General    No history of anesthetic complications          ROS/MED HX    ENT/Pulmonary:     (+)tobacco use, Past use Intermittent asthma (rare need for albuterol (last >6mo)) , . .    Neurologic:       Cardiovascular:     (+) ----. : . . . :. . Previous cardiac testing date:results:date: results:ECG reviewed date:2020 results:NSR, baseline artifact noted date: results:          METS/Exercise Tolerance:  >4 METS   Hematologic:         Musculoskeletal:         GI/Hepatic:         Renal/Genitourinary:         Endo:     (+) thyroid problem hypothyroidism, Obesity, .      Psychiatric:     (+) psychiatric history bipolar and depression      Infectious Disease:         Malignancy:         Other:                          Physical Exam  Normal systems: dental    Airway   Mallampati: III  TM distance: >3 FB  Neck ROM: full  Comment: Poor effort    Dental     Cardiovascular   Rhythm and rate: regular      Pulmonary    breath sounds clear to auscultation            Lab Results   Component Value Date    WBC 3.5 (L) 2019    HGB 14.0 2020    HCT 41.3 2019     2019     2020    POTASSIUM 3.8 2020    CHLORIDE 110 (H) 2020    CO2 24 2020    BUN 2 (L) 2020    CR 0.82 2020     (H) 2020    SALVATORE 8.4 (L) 2020    PHOS 2.9 2010    MAG 2.3 2010    ALBUMIN 4.1 2020    PROTTOTAL 7.5 2020     " (H) 02/11/2020    AST 88 (H) 02/11/2020    ALKPHOS 89 02/11/2020    BILITOTAL 0.4 02/11/2020    LIPASE 170 01/03/2019    AMYLASE 34 04/09/2010    PTT 26 04/12/2010    INR 0.94 04/12/2010    FIBR 378 04/12/2010    TSH 51.99 (H) 02/11/2020    T4 0.53 (L) 02/11/2020    HCG Negative 04/12/2015    HCGS Negative 04/09/2010       Preop Vitals  BP Readings from Last 3 Encounters:   02/26/20 (!) 138/94   02/24/20 (!) 140/94   02/21/20 (!) 137/90    Pulse Readings from Last 3 Encounters:   02/24/20 65   02/21/20 70   02/13/20 81      Resp Readings from Last 3 Encounters:   02/26/20 16   02/24/20 16   02/21/20 20    SpO2 Readings from Last 3 Encounters:   02/26/20 95%   02/24/20 95%   02/21/20 96%      Temp Readings from Last 1 Encounters:   02/26/20 36.6  C (97.8  F) (Temporal)    Ht Readings from Last 1 Encounters:   02/11/20 1.626 m (5' 4\")      Wt Readings from Last 1 Encounters:   02/13/20 88.9 kg (196 lb)    Estimated body mass index is 33.64 kg/m  as calculated from the following:    Height as of 2/11/20: 1.626 m (5' 4\").    Weight as of 2/13/20: 88.9 kg (196 lb).       Anesthesia Plan      History & Physical Review  History and physical reviewed and following examination; no interval change.    ASA Status:  2 .    NPO Status:  > 8 hours    Plan for General (mask ventilation) with Intravenous (methohexital) induction.   PONV prophylaxis:  Ondansetron (or other 5HT-3)       Postoperative Care      Consents  Anesthetic plan, risks, benefits and alternatives discussed with:  Patient..                 Jam Bonilla MD  "

## 2020-02-26 NOTE — ANESTHESIA POSTPROCEDURE EVALUATION
Patient: Eve Byrd    * No procedures listed *    Diagnosis:* No pre-op diagnosis entered *  Diagnosis Additional Information: No value filed.    Anesthesia Type:  General    Note:  Anesthesia Post Evaluation    Patient location during evaluation: Bedside  Patient participation: Able to fully participate in evaluation  Level of consciousness: awake and alert  Pain management: adequate  Airway patency: patent  Cardiovascular status: acceptable  Respiratory status: acceptable  Hydration status: acceptable  PONV: none             Last vitals:  Vitals:    02/26/20 0730 02/26/20 0735 02/26/20 0740   BP: 127/83 115/80 124/85   Pulse: 72 70 71   Resp: 17 14 15   Temp:      SpO2: 93% 92% 93%         Electronically Signed By: Jam Bonilla MD  February 26, 2020  8:29 AM

## 2020-02-26 NOTE — PROCEDURES
[facility] ECT Procedure Note     Eve Byrd [id number]   49 year old 1970     Patient Status: Outpatient    Allergies   Allergen Reactions     Cats      No Known Drug Allergy        Weight:  0 lbs 0 oz              Diagnosis:   Major depression       Indications for ECT:   Medications ineffective       Pause for the Cause:     Right patient Yes   Right procedure/laterality settings: Yes   Right diagnosis Yes          Intra-Procedure Documentation:     Date:  2/26/2020  Time:  6:50 AM    ECT #    Treatment number this series: 3   Total treatment number: 3   Type of ECT:  Bilateral, standard    ECT Medications administered: See MAR and Anesthesia record         Clinical Narrative:     ECT was administered by Thymatron machine.  Pt has been medically cleared for procedure, consent signed. Side effects, Risks and benefits reviewed.    ECT Strip Summary:   Energy Level: 60 percent  Motor Seizure Duration: 20 seconds  EEG Seizure Duration: 37 seconds    Complications: No    Plan: 4th ECT 3/2/20  Outpatient Psychiatrist:Dr Allison Ordoñez MD

## 2020-02-28 ENCOUNTER — ANESTHESIA EVENT (OUTPATIENT)
Dept: SURGERY | Facility: CLINIC | Age: 50
End: 2020-02-28

## 2020-02-28 ENCOUNTER — ANESTHESIA (OUTPATIENT)
Dept: SURGERY | Facility: CLINIC | Age: 50
End: 2020-02-28

## 2020-03-02 ENCOUNTER — HOSPITAL ENCOUNTER (OUTPATIENT)
Dept: SURGERY | Facility: CLINIC | Age: 50
Discharge: HOME | End: 2020-03-02
Attending: PSYCHIATRY & NEUROLOGY | Admitting: PSYCHIATRY & NEUROLOGY
Payer: COMMERCIAL

## 2020-03-02 ENCOUNTER — ANESTHESIA (OUTPATIENT)
Dept: SURGERY | Facility: CLINIC | Age: 50
End: 2020-03-02

## 2020-03-02 ENCOUNTER — ANESTHESIA EVENT (OUTPATIENT)
Dept: SURGERY | Facility: CLINIC | Age: 50
End: 2020-03-02

## 2020-03-02 VITALS
RESPIRATION RATE: 11 BRPM | OXYGEN SATURATION: 95 % | SYSTOLIC BLOOD PRESSURE: 125 MMHG | HEART RATE: 70 BPM | DIASTOLIC BLOOD PRESSURE: 97 MMHG

## 2020-03-02 DIAGNOSIS — F33.2 SEVERE RECURRENT MAJOR DEPRESSION WITHOUT PSYCHOTIC FEATURES (H): Primary | ICD-10-CM

## 2020-03-02 PROCEDURE — 25800030 ZZH RX IP 258 OP 636: Performed by: NURSE ANESTHETIST, CERTIFIED REGISTERED

## 2020-03-02 PROCEDURE — 25800030 ZZH RX IP 258 OP 636: Performed by: ANESTHESIOLOGY

## 2020-03-02 PROCEDURE — 40000010 ZZH STATISTIC ANES STAT CODE-CRNA PER MINUTE

## 2020-03-02 PROCEDURE — 25000125 ZZHC RX 250: Performed by: ANESTHESIOLOGY

## 2020-03-02 PROCEDURE — 25000125 ZZHC RX 250: Performed by: NURSE ANESTHETIST, CERTIFIED REGISTERED

## 2020-03-02 PROCEDURE — 25000566 ZZH SEVOFLURANE, EA 15 MIN

## 2020-03-02 PROCEDURE — 90870 ELECTROCONVULSIVE THERAPY: CPT

## 2020-03-02 PROCEDURE — 25000128 H RX IP 250 OP 636: Performed by: NURSE ANESTHETIST, CERTIFIED REGISTERED

## 2020-03-02 PROCEDURE — 37000008 ZZH ANESTHESIA TECHNICAL FEE, 1ST 30 MIN

## 2020-03-02 RX ORDER — SODIUM CHLORIDE, SODIUM LACTATE, POTASSIUM CHLORIDE, CALCIUM CHLORIDE 600; 310; 30; 20 MG/100ML; MG/100ML; MG/100ML; MG/100ML
500 INJECTION, SOLUTION INTRAVENOUS CONTINUOUS
Status: DISCONTINUED | OUTPATIENT
Start: 2020-03-02 | End: 2020-03-03 | Stop reason: HOSPADM

## 2020-03-02 RX ORDER — ESMOLOL HYDROCHLORIDE 10 MG/ML
INJECTION INTRAVENOUS PRN
Status: DISCONTINUED | OUTPATIENT
Start: 2020-03-02 | End: 2020-03-02

## 2020-03-02 RX ORDER — LABETALOL HYDROCHLORIDE 5 MG/ML
INJECTION, SOLUTION INTRAVENOUS PRN
Status: DISCONTINUED | OUTPATIENT
Start: 2020-03-02 | End: 2020-03-02

## 2020-03-02 RX ORDER — SODIUM CHLORIDE, SODIUM LACTATE, POTASSIUM CHLORIDE, CALCIUM CHLORIDE 600; 310; 30; 20 MG/100ML; MG/100ML; MG/100ML; MG/100ML
INJECTION, SOLUTION INTRAVENOUS CONTINUOUS PRN
Status: DISCONTINUED | OUTPATIENT
Start: 2020-03-02 | End: 2020-03-02

## 2020-03-02 RX ADMIN — METHOHEXITAL SODIUM 100 MG: 500 INJECTION, POWDER, LYOPHILIZED, FOR SOLUTION INTRAMUSCULAR; INTRAVENOUS; RECTAL at 06:28

## 2020-03-02 RX ADMIN — ESMOLOL HYDROCHLORIDE 20 MG: 10 INJECTION, SOLUTION INTRAVENOUS at 06:32

## 2020-03-02 RX ADMIN — LIDOCAINE HYDROCHLORIDE 1 ML: 10 INJECTION, SOLUTION EPIDURAL; INFILTRATION; INTRACAUDAL; PERINEURAL at 06:48

## 2020-03-02 RX ADMIN — SODIUM CHLORIDE, POTASSIUM CHLORIDE, SODIUM LACTATE AND CALCIUM CHLORIDE: 600; 310; 30; 20 INJECTION, SOLUTION INTRAVENOUS at 06:26

## 2020-03-02 RX ADMIN — LABETALOL HYDROCHLORIDE 10 MG: 5 INJECTION INTRAVENOUS at 06:36

## 2020-03-02 RX ADMIN — SUCCINYLCHOLINE CHLORIDE 100 MG: 20 INJECTION, SOLUTION INTRAMUSCULAR; INTRAVENOUS; PARENTERAL at 06:28

## 2020-03-02 RX ADMIN — SODIUM CHLORIDE, POTASSIUM CHLORIDE, SODIUM LACTATE AND CALCIUM CHLORIDE 500 ML: 600; 310; 30; 20 INJECTION, SOLUTION INTRAVENOUS at 06:47

## 2020-03-02 ASSESSMENT — LIFESTYLE VARIABLES: TOBACCO_USE: 1

## 2020-03-02 NOTE — PROCEDURES
[facility] ECT Procedure Note     Eve Byrd [id number]   49 year old 1970     Patient Status: Outpatient    Allergies   Allergen Reactions     Cats      No Known Drug Allergy        Weight:  0 lbs 0 oz              Diagnosis:   Major depression       Indications for ECT:   Medications ineffective       Pause for the Cause:     Right patient Yes   Right procedure/laterality settings: Yes   Right diagnosis Yes          Intra-Procedure Documentation:     Date:  3/2/2020  Time:  6:50 AM    ECT #    Treatment number this series: 4   Total treatment number: 4   Type of ECT:  Bilateral, standard    ECT Medications administered: See MAR and Anesthesia record         Clinical Narrative:     ECT was administered by Thymatron machine.  Pt has been medically cleared for procedure, consent signed. Side effects, Risks and benefits reviewed.    ECT Strip Summary:   Energy Level: 65 percent  Motor Seizure Duration: 20 seconds  EEG Seizure Duration: 37 seconds    Complications: No    Plan: 5th ECT 3/4/20  Outpatient Psychiatrist:Dr Allison Ordoñez MD

## 2020-03-02 NOTE — ANESTHESIA PREPROCEDURE EVALUATION
Anesthesia Pre-Procedure Evaluation    Patient: Eve Byrd   MRN: [id number] : 1970          Preoperative Diagnosis: * No pre-op diagnosis entered *    * No procedures listed *    Past Medical History:   Diagnosis Date     Asthma      Bipolar disorder (H)      Hypothyroidism 2014     Thyroid disease      Past Surgical History:   Procedure Laterality Date     LAPAROSCOPIC CHOLECYSTECTOMY  2014    Procedure: LAPAROSCOPIC CHOLECYSTECTOMY;  Surgeon: Maged Mccabe MD;  Location: [facility] OR       Anesthesia Evaluation     . Pt has had prior anesthetic. Type: General    No history of anesthetic complications          ROS/MED HX    ENT/Pulmonary:     (+)tobacco use, Past use Intermittent asthma (rare need for albuterol (last >6mo)) , . .    Neurologic:       Cardiovascular:     (+) ----. : . . . :. . Previous cardiac testing date:results:date: results:ECG reviewed date:2020 results:NSR, baseline artifact noted date: results:          METS/Exercise Tolerance:  >4 METS   Hematologic:         Musculoskeletal:         GI/Hepatic:        (-) GERD   Renal/Genitourinary:         Endo:     (+) thyroid problem hypothyroidism, Obesity, .      Psychiatric:     (+) psychiatric history bipolar and depression      Infectious Disease:         Malignancy:         Other:                          Physical Exam  Normal systems: dental    Airway   Mallampati: II  TM distance: >3 FB  Neck ROM: full    Dental     Cardiovascular   Rhythm and rate: regular and normal      Pulmonary             Lab Results   Component Value Date    WBC 3.5 (L) 2019    HGB 14.0 2020    HCT 41.3 2019     2019     2020    POTASSIUM 3.8 2020    CHLORIDE 110 (H) 2020    CO2 24 2020    BUN 2 (L) 2020    CR 0.82 2020     (H) 2020    SALVATORE 8.4 (L) 2020    PHOS 2.9 2010    MAG 2.3 2010    ALBUMIN 4.1 2020    PROTTOTAL 7.5 2020    ALT  "102 (H) 02/11/2020    AST 88 (H) 02/11/2020    ALKPHOS 89 02/11/2020    BILITOTAL 0.4 02/11/2020    LIPASE 170 01/03/2019    AMYLASE 34 04/09/2010    PTT 26 04/12/2010    INR 0.94 04/12/2010    FIBR 378 04/12/2010    TSH 51.99 (H) 02/11/2020    T4 0.53 (L) 02/11/2020    HCG Negative 04/12/2015    HCGS Negative 04/09/2010       Preop Vitals  BP Readings from Last 3 Encounters:   02/26/20 124/85   02/24/20 (!) 140/94   02/21/20 (!) 137/90    Pulse Readings from Last 3 Encounters:   02/26/20 71   02/24/20 65   02/21/20 70      Resp Readings from Last 3 Encounters:   02/26/20 15   02/24/20 16   02/21/20 20    SpO2 Readings from Last 3 Encounters:   02/26/20 93%   02/24/20 95%   02/21/20 96%      Temp Readings from Last 1 Encounters:   02/26/20 36.6  C (97.8  F) (Temporal)    Ht Readings from Last 1 Encounters:   02/11/20 1.626 m (5' 4\")      Wt Readings from Last 1 Encounters:   02/13/20 88.9 kg (196 lb)    Estimated body mass index is 33.64 kg/m  as calculated from the following:    Height as of 2/11/20: 1.626 m (5' 4\").    Weight as of 2/13/20: 88.9 kg (196 lb).       Anesthesia Plan      History & Physical Review  History and physical reviewed and following examination; no interval change.    ASA Status:  2 .    NPO Status:  > 8 hours    Plan for General          Postoperative Care      Consents  Anesthetic plan, risks, benefits and alternatives discussed with:  Patient..                 Migel Lemon MD  "

## 2020-03-02 NOTE — ANESTHESIA POSTPROCEDURE EVALUATION
Patient: Eve Byrd    * No procedures listed *    Diagnosis:* No pre-op diagnosis entered *  Diagnosis Additional Information: No value filed.    Anesthesia Type:  No value filed.    Note:  Anesthesia Post Evaluation    Patient location during evaluation: PACU  Patient participation: Able to fully participate in evaluation  Level of consciousness: awake  Pain management: adequate  Airway patency: patent  Cardiovascular status: acceptable  Respiratory status: acceptable  Hydration status: acceptable  PONV: none     Anesthetic complications: None          Last vitals:  Vitals:    03/02/20 0705 03/02/20 0710 03/02/20 0715   BP: (!) 125/97     Pulse: 70     Resp: 14 13 11   SpO2: 95%           Electronically Signed By: Migel Lemon MD  March 2, 2020  7:21 AM

## 2020-03-04 ENCOUNTER — ANESTHESIA (OUTPATIENT)
Dept: SURGERY | Facility: CLINIC | Age: 50
End: 2020-03-04

## 2020-03-04 ENCOUNTER — ANESTHESIA EVENT (OUTPATIENT)
Dept: SURGERY | Facility: CLINIC | Age: 50
End: 2020-03-04

## 2020-03-04 ENCOUNTER — HOSPITAL ENCOUNTER (OUTPATIENT)
Dept: SURGERY | Facility: CLINIC | Age: 50
Discharge: HOME | End: 2020-03-04
Attending: PSYCHIATRY & NEUROLOGY | Admitting: PSYCHIATRY & NEUROLOGY
Payer: COMMERCIAL

## 2020-03-04 VITALS
DIASTOLIC BLOOD PRESSURE: 80 MMHG | TEMPERATURE: 99.9 F | SYSTOLIC BLOOD PRESSURE: 136 MMHG | HEART RATE: 80 BPM | RESPIRATION RATE: 16 BRPM | OXYGEN SATURATION: 94 %

## 2020-03-04 DIAGNOSIS — F33.2 SEVERE RECURRENT MAJOR DEPRESSION WITHOUT PSYCHOTIC FEATURES (H): Primary | ICD-10-CM

## 2020-03-04 PROCEDURE — 25000128 H RX IP 250 OP 636: Performed by: NURSE ANESTHETIST, CERTIFIED REGISTERED

## 2020-03-04 PROCEDURE — 40000010 ZZH STATISTIC ANES STAT CODE-CRNA PER MINUTE

## 2020-03-04 PROCEDURE — 90870 ELECTROCONVULSIVE THERAPY: CPT

## 2020-03-04 PROCEDURE — 25800030 ZZH RX IP 258 OP 636: Performed by: ANESTHESIOLOGY

## 2020-03-04 PROCEDURE — 25000125 ZZHC RX 250: Performed by: NURSE ANESTHETIST, CERTIFIED REGISTERED

## 2020-03-04 PROCEDURE — 37000008 ZZH ANESTHESIA TECHNICAL FEE, 1ST 30 MIN

## 2020-03-04 RX ORDER — SODIUM CHLORIDE, SODIUM LACTATE, POTASSIUM CHLORIDE, CALCIUM CHLORIDE 600; 310; 30; 20 MG/100ML; MG/100ML; MG/100ML; MG/100ML
500 INJECTION, SOLUTION INTRAVENOUS CONTINUOUS
Status: DISCONTINUED | OUTPATIENT
Start: 2020-03-04 | End: 2020-03-05 | Stop reason: HOSPADM

## 2020-03-04 RX ORDER — LIDOCAINE 40 MG/G
CREAM TOPICAL
Status: DISCONTINUED | OUTPATIENT
Start: 2020-03-04 | End: 2020-03-05 | Stop reason: HOSPADM

## 2020-03-04 RX ADMIN — SODIUM CHLORIDE, SODIUM LACTATE, POTASSIUM CHLORIDE, CALCIUM CHLORIDE: 600; 310; 30; 20 INJECTION, SOLUTION INTRAVENOUS at 06:30

## 2020-03-04 RX ADMIN — METHOHEXITAL SODIUM 100 MG: 500 INJECTION, POWDER, LYOPHILIZED, FOR SOLUTION INTRAMUSCULAR; INTRAVENOUS; RECTAL at 06:32

## 2020-03-04 RX ADMIN — SUCCINYLCHOLINE CHLORIDE 100 MG: 20 INJECTION, SOLUTION INTRAMUSCULAR; INTRAVENOUS; PARENTERAL at 06:32

## 2020-03-04 ASSESSMENT — LIFESTYLE VARIABLES: TOBACCO_USE: 1

## 2020-03-04 NOTE — ANESTHESIA POSTPROCEDURE EVALUATION
Patient: Eve Byrd    * No procedures listed *    Diagnosis:* No pre-op diagnosis entered *  Diagnosis Additional Information: No value filed.    Anesthesia Type:  General, Other    Note:  Anesthesia Post Evaluation    Patient location during evaluation: bedside  Patient participation: Able to fully participate in evaluation  Level of consciousness: awake  Pain management: adequate  Airway patency: patent  Cardiovascular status: acceptable  Respiratory status: acceptable  Hydration status: acceptable  PONV: none     Anesthetic complications: None    Comments: No anesthetic complications noted.         Last vitals:  Vitals:    03/04/20 0700 03/04/20 0705 03/04/20 0710   BP: 134/84 133/80 136/80   Pulse:  80    Resp: 13 15 16   Temp:      SpO2: 94% 95% 94%         Electronically Signed By: Isidro Rashid DO, DO  March 4, 2020  8:47 AM

## 2020-03-04 NOTE — ANESTHESIA PREPROCEDURE EVALUATION
Anesthesia Pre-Procedure Evaluation    Patient: Eve Byrd   MRN: [id number] : 1970          Preoperative Diagnosis: * No pre-op diagnosis entered *    * No procedures listed *    Past Medical History:   Diagnosis Date     Asthma      Bipolar disorder (H)      Hypothyroidism 2014     Thyroid disease      Past Surgical History:   Procedure Laterality Date     LAPAROSCOPIC CHOLECYSTECTOMY  2014    Procedure: LAPAROSCOPIC CHOLECYSTECTOMY;  Surgeon: Maged Mccabe MD;  Location: [facility] OR       Anesthesia Evaluation     . Pt has had prior anesthetic. Type: General    No history of anesthetic complications          ROS/MED HX    ENT/Pulmonary:     (+)tobacco use, Past use Intermittent asthma (rare need for albuterol (last >6mo)) , . .    Neurologic:       Cardiovascular:     (+) ----. : . . . :. . Previous cardiac testing date:results:date: results:ECG reviewed date:2020 results:NSR, baseline artifact noted date: results:          METS/Exercise Tolerance:  >4 METS   Hematologic:         Musculoskeletal:         GI/Hepatic:        (-) GERD   Renal/Genitourinary:         Endo:     (+) thyroid problem hypothyroidism, Obesity, .      Psychiatric:     (+) psychiatric history bipolar and depression      Infectious Disease:         Malignancy:         Other:                            Physical Exam  Normal systems: dental    Airway   Mallampati: III  TM distance: >3 FB  Neck ROM: full    Dental     Cardiovascular   Rhythm and rate: regular and normal      Pulmonary             Lab Results   Component Value Date    WBC 3.5 (L) 2019    HGB 14.0 2020    HCT 41.3 2019     2019     2020    POTASSIUM 3.8 2020    CHLORIDE 110 (H) 2020    CO2 24 2020    BUN 2 (L) 2020    CR 0.82 2020     (H) 2020    SALVATORE 8.4 (L) 2020    PHOS 2.9 2010    MAG 2.3 2010    ALBUMIN 4.1 2020    PROTTOTAL 7.5 2020    ALT  "102 (H) 02/11/2020    AST 88 (H) 02/11/2020    ALKPHOS 89 02/11/2020    BILITOTAL 0.4 02/11/2020    LIPASE 170 01/03/2019    AMYLASE 34 04/09/2010    PTT 26 04/12/2010    INR 0.94 04/12/2010    FIBR 378 04/12/2010    TSH 51.99 (H) 02/11/2020    T4 0.53 (L) 02/11/2020    HCG Negative 04/12/2015    HCGS Negative 04/09/2010       Preop Vitals  BP Readings from Last 3 Encounters:   03/04/20 (!) 144/95   03/02/20 (!) 125/97   02/26/20 124/85    Pulse Readings from Last 3 Encounters:   03/02/20 70   02/26/20 71   02/24/20 65      Resp Readings from Last 3 Encounters:   03/04/20 19   03/02/20 11   02/26/20 15    SpO2 Readings from Last 3 Encounters:   03/04/20 97%   03/02/20 95%   02/26/20 93%      Temp Readings from Last 1 Encounters:   03/04/20 36.7  C (98.1  F) (Temporal)    Ht Readings from Last 1 Encounters:   02/11/20 1.626 m (5' 4\")      Wt Readings from Last 1 Encounters:   02/13/20 88.9 kg (196 lb)    Estimated body mass index is 33.64 kg/m  as calculated from the following:    Height as of 2/11/20: 1.626 m (5' 4\").    Weight as of 2/13/20: 88.9 kg (196 lb).       Anesthesia Plan      History & Physical Review  History and physical reviewed and following examination; no interval change.    ASA Status:  2 .    NPO Status:  > 8 hours    Plan for General and Other with Intravenous induction.          Postoperative Care      Consents  Anesthetic plan, risks, benefits and alternatives discussed with:  Patient..                   Vimal Dave MD  "

## 2020-03-04 NOTE — PROCEDURES
[facility] ECT Procedure Note     Eve Byrd [id number]   49 year old 1970     Patient Status: Outpatient    Allergies   Allergen Reactions     Cats      No Known Drug Allergy        Weight:  0 lbs 0 oz              Diagnosis:   Major depression       Indications for ECT:   Medications ineffective       Pause for the Cause:     Right patient Yes   Right procedure/laterality settings: Yes   Right diagnosis Yes          Intra-Procedure Documentation:     Date:  3/4/2020  Time:  6:50 AM    ECT #    Treatment number this series: 5   Total treatment number: 5   Type of ECT:  Bilateral, standard    ECT Medications administered: See MAR and Anesthesia record         Clinical Narrative:     ECT was administered by Thymatron machine.  Pt has been medically cleared for procedure, consent signed. Side effects, Risks and benefits reviewed.    ECT Strip Summary:   Energy Level: 70 percent  Motor Seizure Duration: 25 seconds  EEG Seizure Duration: 37 seconds    Complications: No    Plan: 6th ECT 3/4/20  Outpatient Psychiatrist:Dr Allison García MD

## 2020-03-06 ENCOUNTER — ANESTHESIA (OUTPATIENT)
Dept: SURGERY | Facility: CLINIC | Age: 50
End: 2020-03-06

## 2020-03-06 ENCOUNTER — ANESTHESIA EVENT (OUTPATIENT)
Dept: SURGERY | Facility: CLINIC | Age: 50
End: 2020-03-06

## 2020-03-06 ENCOUNTER — HOSPITAL ENCOUNTER (OUTPATIENT)
Dept: SURGERY | Facility: CLINIC | Age: 50
Discharge: HOME | End: 2020-03-06
Attending: PSYCHIATRY & NEUROLOGY | Admitting: PSYCHIATRY & NEUROLOGY
Payer: COMMERCIAL

## 2020-03-06 VITALS
DIASTOLIC BLOOD PRESSURE: 90 MMHG | SYSTOLIC BLOOD PRESSURE: 127 MMHG | TEMPERATURE: 98.6 F | RESPIRATION RATE: 18 BRPM | HEART RATE: 73 BPM | OXYGEN SATURATION: 97 %

## 2020-03-06 DIAGNOSIS — F33.2 SEVERE RECURRENT MAJOR DEPRESSION WITHOUT PSYCHOTIC FEATURES (H): Primary | ICD-10-CM

## 2020-03-06 PROCEDURE — 37000008 ZZH ANESTHESIA TECHNICAL FEE, 1ST 30 MIN

## 2020-03-06 PROCEDURE — 90870 ELECTROCONVULSIVE THERAPY: CPT

## 2020-03-06 PROCEDURE — 25800030 ZZH RX IP 258 OP 636: Performed by: ANESTHESIOLOGY

## 2020-03-06 PROCEDURE — 40000010 ZZH STATISTIC ANES STAT CODE-CRNA PER MINUTE

## 2020-03-06 RX ORDER — SODIUM CHLORIDE, SODIUM LACTATE, POTASSIUM CHLORIDE, CALCIUM CHLORIDE 600; 310; 30; 20 MG/100ML; MG/100ML; MG/100ML; MG/100ML
INJECTION, SOLUTION INTRAVENOUS CONTINUOUS
Status: DISCONTINUED | OUTPATIENT
Start: 2020-03-06 | End: 2020-03-07 | Stop reason: HOSPADM

## 2020-03-06 RX ADMIN — SODIUM CHLORIDE, POTASSIUM CHLORIDE, SODIUM LACTATE AND CALCIUM CHLORIDE: 600; 310; 30; 20 INJECTION, SOLUTION INTRAVENOUS at 06:23

## 2020-03-06 ASSESSMENT — LIFESTYLE VARIABLES: TOBACCO_USE: 1

## 2020-03-06 NOTE — ANESTHESIA CARE TRANSFER NOTE
Patient: Eve Byrd    * No procedures listed *    Diagnosis: * No pre-op diagnosis entered *  Diagnosis Additional Information: No value filed.    Anesthesia Type:   General, Other     Note:  Airway :Face Mask  Patient transferred to:PACU  Comments: At end of procedure, spontaneous respirations, patient alert to voice, able to follow commands. Oxygen via facemask at 6 liters per minute to PACU. Oxygen tubing connected to wall O2 in PACU, SpO2, NiBP, and EKG monitors and alarms on and functioning, Bridget Hugger warmer connected to patient gown, report on patient's clinical status given to PACU RN, RN questions answered.  Handoff Report: Identifed the Patient, Identified the Reponsible Provider, Reviewed the pertinent medical history, Discussed the surgical course, Reviewed Intra-OP anesthesia mangement and issues during anesthesia, Set expectations for post-procedure period and Allowed opportunity for questions and acknowledgement of understanding      Vitals: (Last set prior to Anesthesia Care Transfer)    CRNA VITALS  3/6/2020 0625 - 3/6/2020 0658      3/6/2020             NIBP:  (!) 152/106    Pulse:  84    NIBP Mean:  124    SpO2:  97 %    Resp Rate (observed):  12    Resp Rate (set):  10                Electronically Signed By: MAKR Hargrove CRNA  March 6, 2020  6:58 AM

## 2020-03-06 NOTE — ANESTHESIA POSTPROCEDURE EVALUATION
Patient: Eve Byrd    * No procedures listed *    Diagnosis:* No pre-op diagnosis entered *  Diagnosis Additional Information: No value filed.    Anesthesia Type:  General, Other    Note:  Anesthesia Post Evaluation    Patient location during evaluation: bedside  Patient participation: Able to fully participate in evaluation  Level of consciousness: awake  Pain management: adequate  Airway patency: patent  Cardiovascular status: acceptable  Respiratory status: acceptable  Hydration status: acceptable  PONV: none     Anesthetic complications: None    Comments: No anesthetic complications noted.         Last vitals:  Vitals:    03/06/20 0716 03/06/20 0720 03/06/20 0725   BP: (!) 131/91 121/69 (!) 127/90   Pulse: 77 87 73   Resp: 15 20 18   Temp: 37  C (98.6  F) 37  C (98.6  F) 37  C (98.6  F)   SpO2: 96% 98% 97%         Electronically Signed By: Isidro Rashid DO, DO  March 6, 2020  3:50 PM

## 2020-03-06 NOTE — PROCEDURES
[facility] ECT Procedure Note     Eve Byrd [id number]   49 year old 1970     Patient Status: Outpatient    Allergies   Allergen Reactions     Cats      No Known Drug Allergy        Weight:  0 lbs 0 oz              Diagnosis:   Major depression       Indications for ECT:   Medications ineffective       Pause for the Cause:     Right patient Yes   Right procedure/laterality settings: Yes   Right diagnosis Yes          Intra-Procedure Documentation:     Date:  3/6/2020  Time:  6:50 AM    ECT #    Treatment number this series: 5   Total treatment number: 5   Type of ECT:  Bilateral, standard    ECT Medications administered: See MAR and Anesthesia record         Clinical Narrative:     ECT was administered by Thymatron machine.  Pt has been medically cleared for procedure, consent signed. Side effects, Risks and benefits reviewed.    ECT Strip Summary:   Energy Level: 100 percent  Motor Seizure Duration: 25 seconds  EEG Seizure Duration: 37 seconds    Complications: No    Plan: done  Outpatient Psychiatrist:Dr Allison García MD

## 2020-03-06 NOTE — ANESTHESIA PREPROCEDURE EVALUATION
Anesthesia Pre-Procedure Evaluation    Patient: Eve Byrd   MRN: [id number] : 1970          Preoperative Diagnosis: * No pre-op diagnosis entered *    * No procedures listed *    Past Medical History:   Diagnosis Date     Asthma      Bipolar disorder (H)      Hypothyroidism 2014     Thyroid disease      Past Surgical History:   Procedure Laterality Date     LAPAROSCOPIC CHOLECYSTECTOMY  2014    Procedure: LAPAROSCOPIC CHOLECYSTECTOMY;  Surgeon: Maged Mccabe MD;  Location: [facility] OR       Anesthesia Evaluation     . Pt has had prior anesthetic. Type: General    No history of anesthetic complications          ROS/MED HX    ENT/Pulmonary:     (+)tobacco use, Past use Intermittent asthma (rare need for albuterol (last >6mo)) , . .    Neurologic:       Cardiovascular:     (+) ----. : . . . :. . Previous cardiac testing date:results:date: results:ECG reviewed date:2020 results:NSR, baseline artifact noted date: results:          METS/Exercise Tolerance:  >4 METS   Hematologic:         Musculoskeletal:         GI/Hepatic:        (-) GERD   Renal/Genitourinary:         Endo:     (+) thyroid problem hypothyroidism, Obesity, .      Psychiatric:     (+) psychiatric history bipolar and depression      Infectious Disease:         Malignancy:         Other:                            Physical Exam  Normal systems: dental    Airway   Mallampati: III  TM distance: >3 FB  Neck ROM: full    Dental     Cardiovascular   Rhythm and rate: regular and normal      Pulmonary             Lab Results   Component Value Date    WBC 3.5 (L) 2019    HGB 14.0 2020    HCT 41.3 2019     2019     2020    POTASSIUM 3.8 2020    CHLORIDE 110 (H) 2020    CO2 24 2020    BUN 2 (L) 2020    CR 0.82 2020     (H) 2020    SALVATORE 8.4 (L) 2020    PHOS 2.9 2010    MAG 2.3 2010    ALBUMIN 4.1 2020    PROTTOTAL 7.5 2020    ALT  "102 (H) 02/11/2020    AST 88 (H) 02/11/2020    ALKPHOS 89 02/11/2020    BILITOTAL 0.4 02/11/2020    LIPASE 170 01/03/2019    AMYLASE 34 04/09/2010    PTT 26 04/12/2010    INR 0.94 04/12/2010    FIBR 378 04/12/2010    TSH 51.99 (H) 02/11/2020    T4 0.53 (L) 02/11/2020    HCG Negative 04/12/2015    HCGS Negative 04/09/2010       Preop Vitals  BP Readings from Last 3 Encounters:   03/06/20 131/84   03/04/20 136/80   03/02/20 (!) 125/97    Pulse Readings from Last 3 Encounters:   03/04/20 80   03/02/20 70   02/26/20 71      Resp Readings from Last 3 Encounters:   03/06/20 16   03/04/20 16   03/02/20 11    SpO2 Readings from Last 3 Encounters:   03/06/20 98%   03/04/20 94%   03/02/20 95%      Temp Readings from Last 1 Encounters:   03/06/20 36.2  C (97.1  F) (Temporal)    Ht Readings from Last 1 Encounters:   02/11/20 1.626 m (5' 4\")      Wt Readings from Last 1 Encounters:   02/13/20 88.9 kg (196 lb)    Estimated body mass index is 33.64 kg/m  as calculated from the following:    Height as of 2/11/20: 1.626 m (5' 4\").    Weight as of 2/13/20: 88.9 kg (196 lb).       Anesthesia Plan      History & Physical Review  History and physical reviewed and following examination; no interval change.    ASA Status:  2 .    NPO Status:  > 8 hours    Plan for General and Other with Intravenous induction.          Postoperative Care      Consents  Anesthetic plan, risks, benefits and alternatives discussed with:  Patient..                   Isidro Rashid, DO, DO  "

## 2021-02-07 NOTE — PATIENT INSTRUCTIONS

## 2021-02-10 ENCOUNTER — NURSE TRIAGE (OUTPATIENT)
Dept: NURSING | Facility: CLINIC | Age: 51
End: 2021-02-10

## 2021-02-10 ENCOUNTER — VIRTUAL VISIT (OUTPATIENT)
Dept: URGENT CARE | Facility: CLINIC | Age: 51
End: 2021-02-10
Payer: COMMERCIAL

## 2021-02-10 DIAGNOSIS — R05.9 COUGH: Primary | ICD-10-CM

## 2021-02-10 PROCEDURE — 99214 OFFICE O/P EST MOD 30 MIN: CPT | Mod: TEL | Performed by: EMERGENCY MEDICINE

## 2021-02-10 RX ORDER — ALBUTEROL SULFATE 90 UG/1
2 AEROSOL, METERED RESPIRATORY (INHALATION) EVERY 6 HOURS
Qty: 1 INHALER | Refills: 1 | Status: SHIPPED | OUTPATIENT
Start: 2021-02-10 | End: 2021-04-28

## 2021-02-10 NOTE — TELEPHONE ENCOUNTER
Patient calls with covid-19 concerns. She reports that she was around someone who was positive 2 1/2 weeks ago. Patient reports that on Monday she developed bodyaches, a headache and fatigue. Patient does not have a thermometer to check her temperature. Denies chest pain or difficulty breathing, patient does have asthma.      Patient is advised on a virtual visit in the next 24 hours. She does not have Internet access so would prefer a phone visit. Patient is transferred to [facility].    Kirsten Padilla RN  [facility]\        Reason for Disposition    [1] COVID-19 infection suspected by caller or triager AND [2] mild symptoms (cough, fever, or others) AND [3] no complications or SOB    Additional Information    Negative: SEVERE difficulty breathing (e.g., struggling for each breath, speaks in single words)    Negative: Difficult to awaken or acting confused (e.g., disoriented, slurred speech)    Negative: Bluish (or gray) lips or face now    Negative: Shock suspected (e.g., cold/pale/clammy skin, too weak to stand, low BP, rapid pulse)    Negative: Sounds like a life-threatening emergency to the triager    Negative: [1] COVID-19 exposure AND [2] no symptoms    Negative: [1] Lives with someone known to have influenza (flu test positive) AND [2] flu-like symptoms (e.g., cough, runny nose, sore throat, SOB; with or without fever)    Negative: [1] Adult with possible COVID-19 symptoms AND [2] triager concerned about severity of symptoms or other causes    Negative: COVID-19 vaccine reaction suspected (e.g., fever, headache, muscle aches) occurring during days 1-3 after getting vaccine    Negative: COVID-19 vaccine, questions about    Negative: COVID-19 and breastfeeding, questions about    Negative: SEVERE or constant chest pain or pressure (Exception: mild central chest pain, present only when coughing)    Negative: MODERATE difficulty breathing (e.g., speaks in phrases, SOB even at rest, pulse  100-120)    Negative: [1] Headache AND [2] stiff neck (can't touch chin to chest)    Negative: MILD difficulty breathing (e.g., minimal/no SOB at rest, SOB with walking, pulse <100)    Negative: Chest pain or pressure    Negative: Patient sounds very sick or weak to the triager    Negative: Fever > 103 F (39.4 C)    Negative: [1] Fever > 101 F (38.3 C) AND [2] age > 60    Negative: [1] Fever > 100.0 F (37.8 C) AND [2] bedridden (e.g., nursing home patient, CVA, chronic illness, recovering from surgery)    Negative: [1] HIGH RISK patient (e.g., age > 64 years, diabetes, heart or lung disease, weak immune system) AND [2] new or worsening symptoms    Negative: [1] HIGH RISK patient AND [2] influenza is widespread in the community AND [3] ONE OR MORE respiratory symptoms: cough, sore throat, runny or stuffy nose    Negative: [1] HIGH RISK patient AND [2] influenza exposure within the last 7 days AND [3] ONE OR MORE respiratory symptoms: cough, sore throat, runny or stuffy nose    Negative: Fever present > 3 days (72 hours)    Negative: [1] Fever returns after gone for over 24 hours AND [2] symptoms worse or not improved    Negative: [1] Continuous (nonstop) coughing interferes with work or school AND [2] no improvement using cough treatment per protocol    Protocols used: CORONAVIRUS (COVID-19) DIAGNOSED OR [id number]-G-GO 1.3    COVID 19 Nurse Triage Plan/Patient Instructions    Please be aware that novel coronavirus (COVID-19) may be circulating in the community. If you develop symptoms such as fever, cough, or SOB or if you have concerns about the presence of another infection including coronavirus (COVID-19), please contact your health care provider or visit [email or web address].     Disposition/Instructions    Virtual Visit with provider recommended. Reference Visit Selection Guide.    Thank you for taking steps to prevent the spread of this virus.  o Limit your contact with others.  o Wear a simple mask to cover your  cough.  o Wash your hands well and often.    Resources    [facility]: About COVID-19: [email or web address]    CDC: What to Do If You're Sick: www.cdc.gov/coronavirus/2019-ncov/about/steps-when-sick.html    CDC: Ending Home Isolation: www.cdc.gov/coronavirus/2019-ncov/hcp/disposition-in-home-patients.html     CDC: Caring for Someone: www.cdc.gov/coronavirus/2019-ncov/if-you-are-sick/care-for-someone.html     [facility]: Interim Guidance for Hospital Discharge to Home: www.health.[facility]/diseases/coronavirus/hcp/hospdischarge.pdf    [facility] clinical trials (COVID-19 research studies): clinicalaffairs.[facility]-clinical-trials     Below are the COVID-19 hotlines at the [place] Department of Health ([facility]). Interpreters are available.   o For health questions: Call 440-671-0505 or 1-917.975.5260 (7 a.m. to 7 p.m.)  o For questions about schools and childcare: Call 530-460-2130 or 1-972.186.1411 (7 a.m. to 7 p.m.)

## 2021-02-10 NOTE — PROGRESS NOTES
HPI: Patient is a 50-year-old female who is had a 2 to 3-day history of body aches, headache, fatigue, chills, nausea, and slight cough.  No shortness of breath or chest pain.  She does have a history of asthma but does not feel she is wheezing significantly.  Overall she feels slightly better.  She does describe having a Covid exposure to half weeks ago.  None more recently that she is aware of.      ROS: See HPI otherwise normal.    Allergies   Allergen Reactions     Cats      No Known Drug Allergy       Current Outpatient Medications   Medication Sig Dispense Refill     albuterol (PROAIR HFA/PROVENTIL HFA/VENTOLIN HFA) 108 (90 Base) MCG/ACT inhaler Inhale 2 puffs into the lungs every 6 hours 1 Inhaler 1     albuterol (PROAIR HFA, PROVENTIL HFA, VENTOLIN HFA) 108 (90 BASE) MCG/ACT inhaler Inhale 2 puffs into the lungs every 4 hours as needed 1 Inhaler 11     lamoTRIgine (LAMICTAL) 25 MG tablet 1 po BID 60 tablet 0     levothyroxine (SYNTHROID/LEVOTHROID) 112 MCG tablet Take 1 tablet (112 mcg) by mouth daily 90 tablet 1     Lurasidone HCl (LATUDA PO) Take 80 mg by mouth daily        VIIBRYD 40 MG TABS tablet Take 40 mg by mouth daily        Vortioxetine HBr (TRINTELLIX PO) Take 20 mg by mouth daily            PE: No acute distress and nondyspneic sounding on the phone.  She is clearly alert and oriented.        TREATMENT: None      ASSESSMENT: Multiple symptoms suspicious for Covid.  No severe associated symptoms at this time.  Patient does have a history of asthma but does not appear to be exacerbated at this time.      DIAGNOSIS: Upper respiratory infection symptoms.  Asthma.      PLAN: Covid PCR ordered.  Testing team to follow.  Albuterol inhaler called in for as needed use.  Follow-up with PCP if any worsening symptoms or concerns.    Time: 10 minutes

## 2021-02-11 ENCOUNTER — TELEPHONE (OUTPATIENT)
Dept: INTERNAL MEDICINE | Facility: CLINIC | Age: 51
End: 2021-02-11

## 2021-02-11 DIAGNOSIS — R05.9 COUGH: ICD-10-CM

## 2021-02-11 LAB
LABORATORY COMMENT REPORT: NORMAL
SARS-COV-2 RNA RESP QL NAA+PROBE: NEGATIVE
SARS-COV-2 RNA RESP QL NAA+PROBE: NORMAL
SPECIMEN SOURCE: NORMAL
SPECIMEN SOURCE: NORMAL

## 2021-02-11 PROCEDURE — U0005 INFEC AGEN DETEC AMPLI PROBE: HCPCS | Performed by: EMERGENCY MEDICINE

## 2021-02-11 PROCEDURE — 99207 PR NO CHARGE LOS: CPT

## 2021-02-11 PROCEDURE — U0003 INFECTIOUS AGENT DETECTION BY NUCLEIC ACID (DNA OR RNA); SEVERE ACUTE RESPIRATORY SYNDROME CORONAVIRUS 2 (SARS-COV-2) (CORONAVIRUS DISEASE [COVID-19]), AMPLIFIED PROBE TECHNIQUE, MAKING USE OF HIGH THROUGHPUT TECHNOLOGIES AS DESCRIBED BY CMS-2020-01-R: HCPCS | Performed by: EMERGENCY MEDICINE

## 2021-02-11 NOTE — TELEPHONE ENCOUNTER
We will not be able to do any letter until results of Covid test are available as I will determine work status and return to work date.

## 2021-02-11 NOTE — TELEPHONE ENCOUNTER
Patient calling in requesting a letter for her employer as she has been out of work for the past several days. The patient states she had a COVID test but has not received the results yet, but her employer is requesting. If you have any questions for the patient please contact her at [phone] okay to leave a detailed vm. If letter can be done patient states she will print from her [employer].    .Radha PATEL    [facility] [place]

## 2021-02-15 NOTE — TELEPHONE ENCOUNTER
VM left for patient that letter was send out on 2/12/21 and she can also print it from [employer]. She can call back with questions to OX.

## 2021-03-06 ENCOUNTER — OFFICE VISIT (OUTPATIENT)
Dept: URGENT CARE | Facility: URGENT CARE | Age: 51
End: 2021-03-06
Payer: COMMERCIAL

## 2021-03-06 VITALS
SYSTOLIC BLOOD PRESSURE: 142 MMHG | HEART RATE: 83 BPM | DIASTOLIC BLOOD PRESSURE: 102 MMHG | TEMPERATURE: 98.1 F | OXYGEN SATURATION: 97 %

## 2021-03-06 DIAGNOSIS — J45.41 MODERATE PERSISTENT ASTHMA WITH EXACERBATION: Primary | ICD-10-CM

## 2021-03-06 PROCEDURE — 99214 OFFICE O/P EST MOD 30 MIN: CPT | Performed by: FAMILY MEDICINE

## 2021-03-06 RX ORDER — PREDNISONE 10 MG/1
10 TABLET ORAL DAILY
Qty: 39 TABLET | Refills: 0 | Status: SHIPPED | OUTPATIENT
Start: 2021-03-06 | End: 2021-04-28

## 2021-03-06 RX ORDER — ALBUTEROL SULFATE 90 UG/1
1-2 POWDER, METERED RESPIRATORY (INHALATION) EVERY 6 HOURS PRN
Qty: 1 EACH | Refills: 0 | Status: SHIPPED | OUTPATIENT
Start: 2021-03-06 | End: 2024-09-05

## 2021-03-06 NOTE — PROGRESS NOTES
ASSESSMENT/  PLAN:  Moderate persistent asthma with exacerbation     - predniSONE (DELTASONE) 10 MG tablet; Take 1 tablet (10 mg) by mouth daily 6 tab days1+2,  5 tab days 3+4 , 4 tab days 5+6 , 3 tab days 7+8, 2 tab day 9, 1 tab day 10  - albuterol (PROAIR RESPICLICK) 108 (90 Base) MCG/ACT inhaler; Inhale 1-2 puffs into the lungs every 6 hours as needed for shortness of breath / dyspnea or wheezing       Medication: continue current asthma medication regimen unchanged-  Albuterol inhaler q 2 hours prn        Discussed medication dosage, usage, side effects, and goals of   treatment in detail.     Avoidance of precipitants.    Return if worsening shortness of breath.    Follow-up with primary physician for chronic management of asthma symptoms    Patient Education: Instructed to return to urgent care or notify primary MD office of fever >101, blood in sputum, chest pain, dyspnea at rest, or other symptoms of concern to patient.  -------------------------------------------------------------------------------------    SUBJECTIVE:  Chief Complaint   Patient presents with     Urgent Care     Asthma     pt has been having to use her asthma inhaler every half hour. Inhaler is helping with her breathing but she know she will get SOB in about an hour     Eve Byrd is a 50 year old female who presents for shortness of breath with wheezing that started 2 weeks ago, mild,  But much more severe x 2 days    A previous diagnosis of asthma was made concerning this patient on the basis of   symptoms and response to medications.   Current symptoms include wheezing, non-productive cough, dyspnea on exertion and chest tightness.   Precipitating factors are uri infections and animal dander. -  She moved into a new apartment complex in the past week with neighbors with animals     Asthma treatment that is used daily/ chronically throughout the year ( none-)  Additional treatments to control this asthma episode  ( albuterol  inhaler q 30 minutes in the last 2 days )    Asthma improved with stopping smoking 4 years ago.   Since then she has not needed daily steroid inhaler    She had negative Covid test 3 weeks ago- she declined covid testing today     Past Medical History:   Diagnosis Date     Asthma      Bipolar disorder (H)      Hypothyroidism 1/6/2014     Thyroid disease      Patient Active Problem List   Diagnosis     Muscle weakness (generalized)     Acute respiratory failure (H)     Cholelithiasis     Cholecystitis     Bipolar affective disorder in remission (H)     Tobacco abuse     Acquired hypothyroidism     CARDIOVASCULAR SCREENING; LDL GOAL LESS THAN 160     Mild intermittent asthma, unspecified whether complicated     Severe recurrent major depression without psychotic features (H)       ALLERGIES:  Cats and No known drug allergy    MEDs  albuterol (PROAIR HFA, PROVENTIL HFA, VENTOLIN HFA) 108 (90 BASE) MCG/ACT inhaler, Inhale 2 puffs into the lungs every 4 hours as needed  albuterol (PROAIR HFA/PROVENTIL HFA/VENTOLIN HFA) 108 (90 Base) MCG/ACT inhaler, Inhale 2 puffs into the lungs every 6 hours  lamoTRIgine (LAMICTAL) 25 MG tablet, 1 po BID  levothyroxine (SYNTHROID/LEVOTHROID) 112 MCG tablet, Take 1 tablet (112 mcg) by mouth daily  Lurasidone HCl (LATUDA PO), Take 80 mg by mouth daily   VIIBRYD 40 MG TABS tablet, Take 40 mg by mouth daily   Vortioxetine HBr (TRINTELLIX PO), Take 20 mg by mouth daily     No current facility-administered medications on file prior to visit.       Social History     Tobacco Use     Smoking status: Former Smoker     Years: 30.00     Types: Cigarettes     Smokeless tobacco: Former User     Quit date: 3/31/2017   Substance Use Topics     Alcohol use: Yes     Alcohol/week: 0.0 standard drinks     Comment: Socially        Family History   Problem Relation Age of Onset     Diabetes Maternal Grandfather      Diabetes Paternal Grandfather      Respiratory Mother         COPD      ROS:  CONSTITUTIONAL:NEGATIVE for fever, chills,   INTEGUMENTARY/SKIN: NEGATIVE for worrisome rashes  or lesions  EYES: NEGATIVE for vision changes or irritation  ENT/MOUTH: NEGATIVE for ear, mouth and throat problems  GI: NEGATIVE for nausea, abdominal pain,   or change in bowel habits      OBJECTIVE: Initial physical exam  BP (!) 142/102   Pulse 83   Temp 98.1  F (36.7  C) (Tympanic)   LMP 04/12/2017   SpO2 97%    GENERAL: alert, mild distress, cooperative  SKIN: skin is clear, no rashes noted  HEAD: The head is normocephalic.   EYES: conjunctivae and cornea normal.without erythema or discharge  NOSE: Clear, no discharge or congestion: THROAT: moist mucous membranes, no erythema.  NECK: The neck is supple, no masses or significant adenopathy noted  LUNGS: POSITIVE  for rhonchi bilaterally and expiratory wheezes bilaterally  CV: regular rate and rhythm. S1 and S2 are normal. No murmurs.

## 2021-03-06 NOTE — PATIENT INSTRUCTIONS
Patient Education     Controlling Your Asthma  You can do a lot to manage your asthma and improve your quality of life. You will need to work with your healthcare provider to make a plan. But it s up to you to put this plan into action.  Why you need to take control  You need to control the inflammation in your lungs to feel well and stay healthy. Take all medicine as directed, especially controller medicines. Take them even if you feel that your asthma is under good control. You also need to ease symptoms when you have them. These are long-term tasks. But the more you stay in control, the better you ll feel. If you don t stay in control:    Asthma symptoms may cause you to miss school, work, or activities that you enjoy.    Asthma flare-ups can be dangerous, even deadly.    Uncontrolled asthma makes it more likely that you will need emergency care.    Uncontrolled asthma may cause lasting damage to your lungs.    Peak flow monitoring helps measure how open your airways are.   Taking medicine helps you control your asthma and ease symptoms when they occur.     Using an Asthma Action Plan will help you keep track of and respond to asthma symptoms.   Staying away from triggers--the things that inflame your airways--will help prevent symptoms and flare-ups.   Your action plan  Your healthcare provider will help you prepare, and when needed, update your personal asthma action plan. Your plan tells you what to do based on your current symptoms. If you don't have an asthma action plan, or if yours isn't up-to-date, make sure you talk with your healthcare provider. Keep a journal of your symptoms and triggers. Take your journal and asthma action plan with you when you visit your healthcare provider. That way, your treatment plan can be reviewed and updated regularly.  [employer] last reviewed this educational content on 5/1/2019 2000-2020 [employer]. All rights reserved. This information is not intended  as a substitute for professional medical care. Always follow your healthcare professional's instructions.

## 2021-03-08 ENCOUNTER — TELEPHONE (OUTPATIENT)
Dept: URGENT CARE | Facility: URGENT CARE | Age: 51
End: 2021-03-08

## 2021-03-08 NOTE — TELEPHONE ENCOUNTER
Prior Authorization Retail Medication Request    Medication/Dose: albuterol (PROAIR RESPICLICK) 108 (90 Base) MCG/ACT inhaler  ICD code (if different than what is on RX):  J45.41  Previously Tried and Failed:    Rationale:      Insurance Name:  [employer]  Insurance ID:  [id number]        Pharmacy Information (if different than what is on RX)  Name:  [facility]  Phone:  [phone]

## 2021-03-09 NOTE — TELEPHONE ENCOUNTER
PA Initiation    Medication: albuterol (PROAIR RESPICLICK) 108 (90 Base) MCG/ACT inhaler-initiated  Insurance Company: [employer] - Phone [phone] Fax [phone]  Pharmacy Filling the Rx: [id number] [place], [place] - [address] AT [facility] [address]  Filling Pharmacy Phone: [phone]  Filling Pharmacy Fax: [phone]  Start Date: 3/9/2021

## 2021-03-09 NOTE — TELEPHONE ENCOUNTER
Prior Authorization Approval    Authorization Effective Date: 2/7/2021  Authorization Expiration Date: 3/9/2022  Medication: albuterol (PROAIR RESPICLICK) 108 (90 Base) MCG/ACT inhaler-initiated  Approved Dose/Quantity: 1 EA  Reference #: [id number]   Insurance Company: [employer] - Phone [phone] Fax [phone]  Expected CoPay:     $63  CoPay Card Available:      Foundation Assistance Needed:    Which Pharmacy is filling the prescription (Not needed for infusion/clinic administered): [id number] - [place], [place] - [address] AT [facility]  Pharmacy Notified: Yes  Patient Notified: Yes                  Central Prior Authorization Team  Phone: [phone]

## 2021-04-03 ENCOUNTER — HEALTH MAINTENANCE LETTER (OUTPATIENT)
Age: 51
End: 2021-04-03

## 2021-04-28 ENCOUNTER — OFFICE VISIT (OUTPATIENT)
Dept: INTERNAL MEDICINE | Facility: CLINIC | Age: 51
End: 2021-04-28
Payer: COMMERCIAL

## 2021-04-28 VITALS
HEIGHT: 64 IN | HEART RATE: 78 BPM | BODY MASS INDEX: 34.64 KG/M2 | TEMPERATURE: 97.5 F | DIASTOLIC BLOOD PRESSURE: 96 MMHG | SYSTOLIC BLOOD PRESSURE: 152 MMHG | OXYGEN SATURATION: 96 % | RESPIRATION RATE: 14 BRPM | WEIGHT: 202.9 LBS

## 2021-04-28 DIAGNOSIS — Z00.00 ROUTINE GENERAL MEDICAL EXAMINATION AT A HEALTH CARE FACILITY: Primary | ICD-10-CM

## 2021-04-28 DIAGNOSIS — J45.20 MILD INTERMITTENT ASTHMA, UNSPECIFIED WHETHER COMPLICATED: ICD-10-CM

## 2021-04-28 DIAGNOSIS — Z12.11 COLON CANCER SCREENING: ICD-10-CM

## 2021-04-28 DIAGNOSIS — F33.2 SEVERE RECURRENT MAJOR DEPRESSION WITHOUT PSYCHOTIC FEATURES (H): ICD-10-CM

## 2021-04-28 DIAGNOSIS — E03.9 ACQUIRED HYPOTHYROIDISM: ICD-10-CM

## 2021-04-28 DIAGNOSIS — Z13.6 CARDIOVASCULAR SCREENING; LDL GOAL LESS THAN 160: ICD-10-CM

## 2021-04-28 DIAGNOSIS — I10 BENIGN ESSENTIAL HYPERTENSION: ICD-10-CM

## 2021-04-28 DIAGNOSIS — R25.1 TREMOR: ICD-10-CM

## 2021-04-28 DIAGNOSIS — M25.649 STIFFNESS OF HAND JOINT, UNSPECIFIED LATERALITY: ICD-10-CM

## 2021-04-28 DIAGNOSIS — Z11.59 ENCOUNTER FOR HEPATITIS C SCREENING TEST FOR LOW RISK PATIENT: ICD-10-CM

## 2021-04-28 DIAGNOSIS — Z12.31 VISIT FOR SCREENING MAMMOGRAM: ICD-10-CM

## 2021-04-28 LAB — HGB BLD-MCNC: 13.7 G/DL (ref 11.7–15.7)

## 2021-04-28 PROCEDURE — 99214 OFFICE O/P EST MOD 30 MIN: CPT | Mod: 25 | Performed by: INTERNAL MEDICINE

## 2021-04-28 PROCEDURE — 99396 PREV VISIT EST AGE 40-64: CPT | Performed by: INTERNAL MEDICINE

## 2021-04-28 PROCEDURE — 84443 ASSAY THYROID STIM HORMONE: CPT | Performed by: INTERNAL MEDICINE

## 2021-04-28 PROCEDURE — 86803 HEPATITIS C AB TEST: CPT | Performed by: INTERNAL MEDICINE

## 2021-04-28 PROCEDURE — 36415 COLL VENOUS BLD VENIPUNCTURE: CPT | Performed by: INTERNAL MEDICINE

## 2021-04-28 PROCEDURE — 80061 LIPID PANEL: CPT | Performed by: INTERNAL MEDICINE

## 2021-04-28 PROCEDURE — 80053 COMPREHEN METABOLIC PANEL: CPT | Performed by: INTERNAL MEDICINE

## 2021-04-28 PROCEDURE — 84439 ASSAY OF FREE THYROXINE: CPT | Performed by: INTERNAL MEDICINE

## 2021-04-28 PROCEDURE — 85018 HEMOGLOBIN: CPT | Performed by: INTERNAL MEDICINE

## 2021-04-28 RX ORDER — LEVOTHYROXINE SODIUM 112 UG/1
112 TABLET ORAL DAILY
Qty: 90 TABLET | Refills: 3 | Status: SHIPPED | OUTPATIENT
Start: 2021-04-28 | End: 2022-05-12

## 2021-04-28 RX ORDER — LISINOPRIL 20 MG/1
20 TABLET ORAL DAILY
Qty: 90 TABLET | Refills: 1 | Status: SHIPPED | OUTPATIENT
Start: 2021-04-28 | End: 2021-10-26

## 2021-04-28 ASSESSMENT — MIFFLIN-ST. JEOR: SCORE: 1520.35

## 2021-04-28 ASSESSMENT — PATIENT HEALTH QUESTIONNAIRE - PHQ9: SUM OF ALL RESPONSES TO PHQ QUESTIONS 1-9: 5

## 2021-04-28 NOTE — PROGRESS NOTES
SUBJECTIVE:   CC: Eve Byrd is an 50 year old woman who presents for preventive health visit.     Patient has been advised of split billing requirements and indicates understanding: Yes  Healthy Habits:     Getting at least 3 servings of Calcium per day:  NO    Bi-annual eye exam:  Yes    Dental care twice a year:  Yes    Sleep apnea or symptoms of sleep apnea:  None    Diet:  Regular (no restrictions)    Frequency of exercise:  None    Taking medications regularly:  Yes    Medication side effects:  None    PHQ-2 Total Score: 1    Additional concerns today:  No      PROBLEMS TO ADD ON...  1. Elevated BP readings while in UC and at dental office   2. Stiffness in hands     Today's PHQ-2 Score:   PHQ-2 ( 1999 [employer]) 2/11/2020   Q1: Little interest or pleasure in doing things 2   Q2: Feeling down, depressed or hopeless 2   PHQ-2 Score 4   Q1: Little interest or pleasure in doing things More than half the days   Q2: Feeling down, depressed or hopeless More than half the days   PHQ-2 Score 4       Abuse: Current or Past (Physical, Sexual or Emotional) - No  Do you feel safe in your environment? Yes    Have you ever done Advance Care Planning? (For example, a Health Directive, POLST, or a discussion with a medical provider or your loved ones about your wishes): Yes, advance care planning is on file.    Social History     Tobacco Use     Smoking status: Former Smoker     Years: 30.00     Types: Cigarettes     Smokeless tobacco: Former User     Quit date: 3/31/2017   Substance Use Topics     Alcohol use: Yes     Alcohol/week: 0.0 standard drinks     Comment: Socially            Reviewed orders with patient.  Reviewed health maintenance and updated orders accordingly - Yes    PAP / HPV Latest Ref Rng & Units 11/15/2016   PAP - NIL   HPV 16 DNA NEG Negative   HPV 18 DNA NEG Negative   OTHER HR HPV NEG Negative     Reviewed and updated as needed this visit by clinical staff                 Reviewed and updated as  "needed this visit by Provider                  Review of Systems  CONSTITUTIONAL: NEGATIVE for fever, chills, change in weight  EYES: NEGATIVE for vision changes or irritation  ENT: NEGATIVE for ear, mouth and throat problems  RESP: NEGATIVE for significant cough or SOB  CV: NEGATIVE for chest pain, palpitations or peripheral edema  GI: NEGATIVE for nausea, abdominal pain, heartburn, or change in bowel habits  : NEGATIVE for unusual urinary or vaginal symptoms. No vaginal bleeding.  MUSCULOSKELETAL: NEGATIVE for significant arthralgias or myalgia  NEURO: NEGATIVE for weakness, dizziness or paresthesias  PSYCHIATRIC: NEGATIVE for changes in mood or affect      OBJECTIVE:   BP (!) 152/96   Pulse 78   Temp 97.5  F (36.4  C) (Temporal)   Resp 14   Ht 1.626 m (5' 4\")   Wt 92 kg (202 lb 14.4 oz)   LMP 04/12/2017   SpO2 96%   BMI 34.83 kg/m    Physical Exam  GENERAL: healthy, alert and no distress  EYES: Eyes grossly normal to inspection, PERRL and conjunctivae and sclerae normal  HENT: ear canals and TM's normal, nose and mouth without ulcers or lesions  NECK: no adenopathy, no asymmetry, masses, or scars and thyroid normal to palpation  RESP: lungs clear to auscultation - no rales, rhonchi or wheezes  BREAST:  Declined per patient  CV: regular rate and rhythm, normal S1 S2, no S3 or S4, no murmur, click or rub, no peripheral edema and peripheral pulses strong  ABDOMEN: soft, nontender, no hepatosplenomegaly, no masses and bowel sounds normal  MS: no gross musculoskeletal defects noted, no edema, no obvious inflammatory changes to hands  NEURO: Normal strength and tone, mentation intact and speech normal, noted resting tremor RUE.  PSYCH: mentation appears normal, affect normal/bright     Creatinine   Date Value Ref Range Status   02/11/2020 0.82 0.52 - 1.04 mg/dL Final       ASSESSMENT/PLAN:   1. Routine general medical examination at a health care facility  Advised patient to consider shingles vaccination.  " Discussed also keeping up with routine Pap smears and mammography.  - Hemoglobin  - Comprehensive metabolic panel  - Lipid Profile    2. CARDIOVASCULAR SCREENING; LDL GOAL LESS THAN 160  Labs ordered as fasting per routine  - Lipid Profile    3. Acquired hypothyroidism  Continuing with thyroid replacement therapy and recheck TSH  - TSH with free T4 reflex  - OFFICE/OUTPT VISIT,EST,LEVL III  - levothyroxine (SYNTHROID/LEVOTHROID) 112 MCG tablet; Take 1 tablet (112 mcg) by mouth daily  Dispense: 90 tablet; Refill: 3    4. Mild intermittent asthma, unspecified whether complicated  Stable per ACT score of 25    5. Severe recurrent major depression without psychotic features (H)  Following up with Dr. Cooper in psychiatry as ordered.  Medically managed as noted    6. Visit for screening mammogram  Ordered as routine screening for tomorrow but patient will see if can obtain today    7. Colon cancer screening  ADVISED COLONOSCOPY FOR ROUTINE PREVENTATIVE CARE.  - GASTROENTEROLOGY ADULT REF PROCEDURE ONLY; Future  - Fecal colorectal cancer screen (FIT); Future    8. Encounter for hepatitis C screening test for low risk patient  Routine screening based on age  - Hepatitis C antibody    9. Benign essential hypertension  Start therapy 20 mg daily, discussed dosing and side effect profile recheck blood pressure 4 to 6 weeks  - lisinopril (ZESTRIL) 20 MG tablet; Take 1 tablet (20 mg) by mouth daily  Dispense: 90 tablet; Refill: 1  - OFFICE/OUTPT VISIT,EST,LEVL III    10. Tremor  Question related to mental health medications.  Discussed evaluation and work-up of tremor.  Noted resting component.  No other focal changes of parkinsonian features.  Suggest discussing with mental health provider cessation of meds to determine source as medication.  Consider neurological assessment pending response  - OFFICE/OUTPT VISIT,EST,LEVL III  - levothyroxine (SYNTHROID/LEVOTHROID) 112 MCG tablet; Take 1 tablet (112 mcg) by mouth daily   "Dispense: 90 tablet; Refill: 3    11. Stiffness of hand joint, unspecified laterality  Discussed with patient no focal changes on exam she would not like to pursue at present time, will observe      Patient has been advised of split billing requirements and indicates understanding: Yes  COUNSELING:  Reviewed preventive health counseling, as reflected in patient instructions       Regular exercise       Healthy diet/nutrition    Estimated body mass index is 33.64 kg/m  as calculated from the following:    Height as of 2/11/20: 1.626 m (5' 4\").    Weight as of 2/13/20: 88.9 kg (196 lb).    Weight management plan: Discussed healthy diet and exercise guidelines    She reports that she has quit smoking. Her smoking use included cigarettes. She quit after 30.00 years of use. She quit smokeless tobacco use about 4 years ago.      Counseling Resources:  ATP IV Guidelines  Pooled Cohorts Equation Calculator  Breast Cancer Risk Calculator  BRCA-Related Cancer Risk Assessment: FHS-7 Tool  FRAX Risk Assessment  ICSI Preventive Guidelines  Dietary Guidelines for Americans, 2010  USDA's MyPlate  ASA Prophylaxis  Lung CA Screening    Prieto Vieira MD  [facility]  "

## 2021-04-29 ENCOUNTER — ANCILLARY PROCEDURE (OUTPATIENT)
Dept: MAMMOGRAPHY | Facility: CLINIC | Age: 51
End: 2021-04-29
Payer: COMMERCIAL

## 2021-04-29 DIAGNOSIS — Z12.31 VISIT FOR SCREENING MAMMOGRAM: ICD-10-CM

## 2021-04-29 LAB
ALBUMIN SERPL-MCNC: 4.3 G/DL (ref 3.4–5)
ALP SERPL-CCNC: 86 U/L (ref 40–150)
ALT SERPL W P-5'-P-CCNC: 55 U/L (ref 0–50)
ANION GAP SERPL CALCULATED.3IONS-SCNC: 3 MMOL/L (ref 3–14)
AST SERPL W P-5'-P-CCNC: 43 U/L (ref 0–45)
BILIRUB SERPL-MCNC: 0.6 MG/DL (ref 0.2–1.3)
BUN SERPL-MCNC: 6 MG/DL (ref 7–30)
CALCIUM SERPL-MCNC: 8.8 MG/DL (ref 8.5–10.1)
CHLORIDE SERPL-SCNC: 106 MMOL/L (ref 94–109)
CHOLEST SERPL-MCNC: 301 MG/DL
CO2 SERPL-SCNC: 27 MMOL/L (ref 20–32)
CREAT SERPL-MCNC: 0.89 MG/DL (ref 0.52–1.04)
GFR SERPL CREATININE-BSD FRML MDRD: 75 ML/MIN/{1.73_M2}
GLUCOSE SERPL-MCNC: 110 MG/DL (ref 70–99)
HCV AB SERPL QL IA: NONREACTIVE
HDLC SERPL-MCNC: 84 MG/DL
LDLC SERPL CALC-MCNC: 183 MG/DL
NONHDLC SERPL-MCNC: 217 MG/DL
POTASSIUM SERPL-SCNC: 4 MMOL/L (ref 3.4–5.3)
PROT SERPL-MCNC: 7.7 G/DL (ref 6.8–8.8)
SODIUM SERPL-SCNC: 136 MMOL/L (ref 133–144)
T4 FREE SERPL-MCNC: 0.74 NG/DL (ref 0.76–1.46)
TRIGL SERPL-MCNC: 170 MG/DL
TSH SERPL DL<=0.005 MIU/L-ACNC: 15.59 MU/L (ref 0.4–4)

## 2021-04-29 PROCEDURE — 77067 SCR MAMMO BI INCL CAD: CPT | Mod: TC | Performed by: RADIOLOGY

## 2021-04-29 ASSESSMENT — ASTHMA QUESTIONNAIRES: ACT_TOTALSCORE: 25

## 2021-05-03 DIAGNOSIS — Z12.11 COLON CANCER SCREENING: ICD-10-CM

## 2021-05-03 PROCEDURE — 82274 ASSAY TEST FOR BLOOD FECAL: CPT | Performed by: INTERNAL MEDICINE

## 2021-05-08 LAB — HEMOCCULT STL QL IA: NEGATIVE

## 2021-05-14 ENCOUNTER — IMMUNIZATION (OUTPATIENT)
Dept: LAB | Facility: CLINIC | Age: 51
End: 2021-05-14
Payer: COMMERCIAL

## 2021-09-18 ENCOUNTER — HEALTH MAINTENANCE LETTER (OUTPATIENT)
Age: 51
End: 2021-09-18

## 2021-10-25 DIAGNOSIS — I10 BENIGN ESSENTIAL HYPERTENSION: ICD-10-CM

## 2021-10-26 RX ORDER — LISINOPRIL 20 MG/1
TABLET ORAL
Qty: 90 TABLET | Refills: 1 | Status: SHIPPED | OUTPATIENT
Start: 2021-10-26 | End: 2022-02-07

## 2021-10-26 NOTE — TELEPHONE ENCOUNTER
Routing refill request to provider for review/approval because:  BP out of range:    BP Readings from Last 3 Encounters:   04/28/21 (!) 152/96   03/06/21 (!) 142/102   03/06/20 (!) 127/90       Ashleigh Valle RN

## 2022-02-07 ENCOUNTER — OFFICE VISIT (OUTPATIENT)
Dept: INTERNAL MEDICINE | Facility: CLINIC | Age: 52
End: 2022-02-07
Payer: COMMERCIAL

## 2022-02-07 VITALS
WEIGHT: 209 LBS | SYSTOLIC BLOOD PRESSURE: 152 MMHG | HEART RATE: 91 BPM | BODY MASS INDEX: 35.68 KG/M2 | RESPIRATION RATE: 15 BRPM | TEMPERATURE: 97.7 F | HEIGHT: 64 IN | OXYGEN SATURATION: 97 % | DIASTOLIC BLOOD PRESSURE: 98 MMHG

## 2022-02-07 DIAGNOSIS — Z12.31 VISIT FOR SCREENING MAMMOGRAM: ICD-10-CM

## 2022-02-07 DIAGNOSIS — I10 BENIGN ESSENTIAL HYPERTENSION: Primary | ICD-10-CM

## 2022-02-07 DIAGNOSIS — Z13.6 CARDIOVASCULAR SCREENING; LDL GOAL LESS THAN 160: ICD-10-CM

## 2022-02-07 DIAGNOSIS — Z12.11 COLON CANCER SCREENING: ICD-10-CM

## 2022-02-07 DIAGNOSIS — E03.9 ACQUIRED HYPOTHYROIDISM: ICD-10-CM

## 2022-02-07 DIAGNOSIS — F31.70 BIPOLAR AFFECTIVE DISORDER IN REMISSION (H): ICD-10-CM

## 2022-02-07 PROCEDURE — 99214 OFFICE O/P EST MOD 30 MIN: CPT | Performed by: INTERNAL MEDICINE

## 2022-02-07 RX ORDER — LISINOPRIL 20 MG/1
40 TABLET ORAL DAILY
Qty: 180 TABLET | Refills: 1 | Status: SHIPPED | OUTPATIENT
Start: 2022-02-07 | End: 2022-08-16

## 2022-02-07 ASSESSMENT — MIFFLIN-ST. JEOR: SCORE: 1543.02

## 2022-02-07 ASSESSMENT — ASTHMA QUESTIONNAIRES: ACT_TOTALSCORE: 25

## 2022-02-07 NOTE — PROGRESS NOTES
"  Assessment & Plan     Benign essential hypertension  Increase lisinopril to 20 mg twice daily recheck blood pressure in 1 month  - Comprehensive metabolic panel; Future  - lisinopril (ZESTRIL) 20 MG tablet; Take 2 tablets (40 mg) by mouth daily Take 1 po BID    Bipolar affective disorder in remission (H)  Noted as baseline history.    Acquired hypothyroidism  Recheck thyroid function panel as ordered  - TSH with free T4 reflex; Future    CARDIOVASCULAR SCREENING; LDL GOAL LESS THAN 160  Labs ordered for routine lipid panel  - Lipid Profile; Future  - Comprehensive metabolic panel; Future    Visit for screening mammogram  Did as routine screening.  Advised patient to schedule appointment accordingly  - *MA Screening Digital Bilateral; Future    Colon cancer screening  Advised patient of need for updated colonoscopy which she has declined.    Also discussed issues of screening lung cancer study as well as Pap smear.  Advised benefit of shingles vaccine.       BMI:   Estimated body mass index is 35.87 kg/m  as calculated from the following:    Height as of this encounter: 1.626 m (5' 4\").    Weight as of this encounter: 94.8 kg (209 lb).   Weight management plan: Discussed healthy diet and exercise guidelines    See Patient Instructions    Return in about 2 months (around 4/7/2022) for Lab Work appointment, f/u OB/GYN for pap smear, routine mammogram, screening, follow-up colonoscopy.    Prieto Vieira MD  [facility] 51 [facility] who presents for the following health issues     HPI     Hypertension Follow-up      Do you check your blood pressure regularly outside of the clinic? No     Are you following a low salt diet? No    Are your blood pressures ever more than 140 on the top number (systolic) OR more   than 90 on the bottom number (diastolic), for example 140/90? N/a       How many servings of fruits and vegetables do you eat daily?  0-1    On average, " "how many sweetened beverages do you drink each day (Examples: soda, juice, sweet tea, etc.  Do NOT count diet or artificially sweetened beverages)?   2    How many days per week do you exercise enough to make your heart beat faster? 3 or less    How many minutes a day do you exercise enough to make your heart beat faster? 9 or less    How many days per week do you miss taking your medication? 0      Review of Systems   CONSTITUTIONAL: NEGATIVE for fever, chills, change in weight  EYES: NEGATIVE for vision changes or irritation  ENT/MOUTH: NEGATIVE for ear, mouth and throat problems  RESP: NEGATIVE for significant cough or SOB  CV: NEGATIVE for chest pain, palpitations or peripheral edema  GI: NEGATIVE for nausea, abdominal pain, heartburn, or change in bowel habits  : NEGATIVE for frequency, dysuria, or hematuria  MUSCULOSKELETAL: NEGATIVE for significant arthralgias or myalgia  NEURO: NEGATIVE for weakness, dizziness or paresthesias  HEME: NEGATIVE for bleeding problems  PSYCHIATRIC: NEGATIVE for changes in mood or affect      Objective    BP (!) 152/98   Pulse 91   Temp 97.7  F (36.5  C) (Temporal)   Resp 15   Ht 1.626 m (5' 4\")   Wt 94.8 kg (209 lb)   LMP 04/12/2017   SpO2 97%   BMI 35.87 kg/m    Body mass index is 35.87 kg/m .  Physical Exam   GENERAL: alert and no distress  Mild obesity  RESP: lungs clear to auscultation - no rales, rhonchi or wheezes  CV: regular rate and rhythm, normal S1 S2, no S3 or S4, no click or rub  NEURO: No focal changes  PSYCH: mentation appears normal, affect normal/bright          "

## 2022-05-11 DIAGNOSIS — E03.9 ACQUIRED HYPOTHYROIDISM: ICD-10-CM

## 2022-05-11 DIAGNOSIS — R25.1 TREMOR: ICD-10-CM

## 2022-05-12 RX ORDER — LEVOTHYROXINE SODIUM 112 UG/1
TABLET ORAL
Qty: 30 TABLET | Refills: 0 | Status: SHIPPED | OUTPATIENT
Start: 2022-05-12 | End: 2022-05-17

## 2022-05-12 NOTE — TELEPHONE ENCOUNTER
I have filled for 30 days only until patient gets lab work done as previously ordered, please inform

## 2022-05-12 NOTE — TELEPHONE ENCOUNTER
Routing refill request to provider for review/approval because:  TSH   Date Value Ref Range Status   04/28/2021 15.59 (H) 0.40 - 4.00 mU/L Final         Bhavik Tyelr RN  [facility] Triage Nurse

## 2022-05-17 DIAGNOSIS — E03.9 ACQUIRED HYPOTHYROIDISM: ICD-10-CM

## 2022-05-17 DIAGNOSIS — R25.1 TREMOR: ICD-10-CM

## 2022-05-17 RX ORDER — LEVOTHYROXINE SODIUM 112 UG/1
TABLET ORAL
Qty: 30 TABLET | Refills: 0 | Status: SHIPPED | OUTPATIENT
Start: 2022-05-17 | End: 2022-05-19

## 2022-05-19 RX ORDER — LEVOTHYROXINE SODIUM 112 UG/1
TABLET ORAL
Qty: 30 TABLET | Refills: 0 | Status: SHIPPED | OUTPATIENT
Start: 2022-05-19 | End: 2022-06-23

## 2022-05-19 NOTE — TELEPHONE ENCOUNTER
I have filled prescription but patient needs to be advised to follow-up to get the routine lab work that was placed as future orders in February.

## 2022-05-19 NOTE — TELEPHONE ENCOUNTER
Routing refill request to provider for review/approval because:  Labs out of range/date:    TSH   Date Value Ref Range Status   04/28/2021 15.59 (H) 0.40 - 4.00 mU/L Final

## 2022-05-23 ENCOUNTER — LAB (OUTPATIENT)
Dept: LAB | Facility: CLINIC | Age: 52
End: 2022-05-23
Payer: COMMERCIAL

## 2022-05-23 DIAGNOSIS — E03.9 ACQUIRED HYPOTHYROIDISM: ICD-10-CM

## 2022-05-23 DIAGNOSIS — Z13.6 CARDIOVASCULAR SCREENING; LDL GOAL LESS THAN 160: ICD-10-CM

## 2022-05-23 DIAGNOSIS — I10 BENIGN ESSENTIAL HYPERTENSION: ICD-10-CM

## 2022-05-23 LAB
ALBUMIN SERPL-MCNC: 4.3 G/DL (ref 3.4–5)
ALP SERPL-CCNC: 94 U/L (ref 40–150)
ALT SERPL W P-5'-P-CCNC: 68 U/L (ref 0–50)
ANION GAP SERPL CALCULATED.3IONS-SCNC: 5 MMOL/L (ref 3–14)
AST SERPL W P-5'-P-CCNC: 45 U/L (ref 0–45)
BILIRUB SERPL-MCNC: 0.3 MG/DL (ref 0.2–1.3)
BUN SERPL-MCNC: 9 MG/DL (ref 7–30)
CALCIUM SERPL-MCNC: 8.9 MG/DL (ref 8.5–10.1)
CHLORIDE BLD-SCNC: 106 MMOL/L (ref 94–109)
CHOLEST SERPL-MCNC: 280 MG/DL
CO2 SERPL-SCNC: 28 MMOL/L (ref 20–32)
CREAT SERPL-MCNC: 0.91 MG/DL (ref 0.52–1.04)
FASTING STATUS PATIENT QL REPORTED: YES
GFR SERPL CREATININE-BSD FRML MDRD: 76 ML/MIN/1.73M2
GLUCOSE BLD-MCNC: 107 MG/DL (ref 70–99)
HDLC SERPL-MCNC: 74 MG/DL
LDLC SERPL CALC-MCNC: 146 MG/DL
NONHDLC SERPL-MCNC: 206 MG/DL
POTASSIUM BLD-SCNC: 3.9 MMOL/L (ref 3.4–5.3)
PROT SERPL-MCNC: 8 G/DL (ref 6.8–8.8)
SODIUM SERPL-SCNC: 139 MMOL/L (ref 133–144)
T4 FREE SERPL-MCNC: 0.75 NG/DL (ref 0.76–1.46)
TRIGL SERPL-MCNC: 298 MG/DL
TSH SERPL DL<=0.005 MIU/L-ACNC: 9.02 MU/L (ref 0.4–4)

## 2022-05-23 PROCEDURE — 84443 ASSAY THYROID STIM HORMONE: CPT

## 2022-05-23 PROCEDURE — 84439 ASSAY OF FREE THYROXINE: CPT

## 2022-05-23 PROCEDURE — 80053 COMPREHEN METABOLIC PANEL: CPT

## 2022-05-23 PROCEDURE — 36415 COLL VENOUS BLD VENIPUNCTURE: CPT

## 2022-05-23 PROCEDURE — 80061 LIPID PANEL: CPT

## 2022-06-23 DIAGNOSIS — R25.1 TREMOR: ICD-10-CM

## 2022-06-23 DIAGNOSIS — E03.9 ACQUIRED HYPOTHYROIDISM: ICD-10-CM

## 2022-06-23 RX ORDER — LEVOTHYROXINE SODIUM 112 UG/1
TABLET ORAL
Qty: 90 TABLET | Refills: 0 | Status: SHIPPED | OUTPATIENT
Start: 2022-06-23 | End: 2022-07-06 | Stop reason: DRUGHIGH

## 2022-06-23 NOTE — TELEPHONE ENCOUNTER
I have filled prescription but per recent lab work patient was advised to make follow-up appointment to discuss dosing change.  Please arrange.

## 2022-06-23 NOTE — TELEPHONE ENCOUNTER
Routing refill request to provider for review/approval because:  Thyroid Protocol Failed 06/23/2022 03:48 AM   Protocol Details  Normal TSH on file in past 12 months     TSH   Date Value Ref Range Status   05/23/2022 9.02 (H) 0.40 - 4.00 mU/L Final   04/28/2021 15.59 (H) 0.40 - 4.00 mU/L Final     Jessy Iglesias RN

## 2022-06-25 ENCOUNTER — HEALTH MAINTENANCE LETTER (OUTPATIENT)
Age: 52
End: 2022-06-25

## 2022-07-06 ENCOUNTER — OFFICE VISIT (OUTPATIENT)
Dept: INTERNAL MEDICINE | Facility: CLINIC | Age: 52
End: 2022-07-06
Payer: COMMERCIAL

## 2022-07-06 VITALS
HEART RATE: 86 BPM | DIASTOLIC BLOOD PRESSURE: 86 MMHG | TEMPERATURE: 97.5 F | BODY MASS INDEX: 35.53 KG/M2 | SYSTOLIC BLOOD PRESSURE: 140 MMHG | WEIGHT: 207 LBS | OXYGEN SATURATION: 93 %

## 2022-07-06 DIAGNOSIS — Z13.6 CARDIOVASCULAR SCREENING; LDL GOAL LESS THAN 160: ICD-10-CM

## 2022-07-06 DIAGNOSIS — Z12.11 COLON CANCER SCREENING: ICD-10-CM

## 2022-07-06 DIAGNOSIS — E66.01 MORBID OBESITY (H): ICD-10-CM

## 2022-07-06 DIAGNOSIS — Z87.891 PERSONAL HISTORY OF TOBACCO USE, PRESENTING HAZARDS TO HEALTH: ICD-10-CM

## 2022-07-06 DIAGNOSIS — I10 BENIGN ESSENTIAL HYPERTENSION: ICD-10-CM

## 2022-07-06 DIAGNOSIS — F33.2 SEVERE RECURRENT MAJOR DEPRESSION WITHOUT PSYCHOTIC FEATURES (H): ICD-10-CM

## 2022-07-06 DIAGNOSIS — E03.9 ACQUIRED HYPOTHYROIDISM: Primary | ICD-10-CM

## 2022-07-06 DIAGNOSIS — Z12.4 CERVICAL CANCER SCREENING: ICD-10-CM

## 2022-07-06 DIAGNOSIS — F31.70 BIPOLAR AFFECTIVE DISORDER IN REMISSION (H): ICD-10-CM

## 2022-07-06 PROCEDURE — 99214 OFFICE O/P EST MOD 30 MIN: CPT | Performed by: INTERNAL MEDICINE

## 2022-07-06 RX ORDER — AMLODIPINE BESYLATE 5 MG/1
5 TABLET ORAL DAILY
Qty: 90 TABLET | Refills: 1 | Status: SHIPPED | OUTPATIENT
Start: 2022-07-06 | End: 2023-01-02

## 2022-07-06 RX ORDER — LEVOTHYROXINE SODIUM 125 UG/1
125 TABLET ORAL DAILY
Qty: 90 TABLET | Refills: 1 | Status: SHIPPED | OUTPATIENT
Start: 2022-07-06 | End: 2023-01-02

## 2022-07-06 ASSESSMENT — PATIENT HEALTH QUESTIONNAIRE - PHQ9
SUM OF ALL RESPONSES TO PHQ QUESTIONS 1-9: 0
SUM OF ALL RESPONSES TO PHQ QUESTIONS 1-9: 0
10. IF YOU CHECKED OFF ANY PROBLEMS, HOW DIFFICULT HAVE THESE PROBLEMS MADE IT FOR YOU TO DO YOUR WORK, TAKE CARE OF THINGS AT HOME, OR GET ALONG WITH OTHER PEOPLE: NOT DIFFICULT AT ALL

## 2022-07-06 NOTE — PROGRESS NOTES
Assessment & Plan     Acquired hypothyroidism  Just increasing levothyroxine to 125 mcg tablet with a recheck of thyroid function profile in 3 months.  - levothyroxine (SYNTHROID/LEVOTHROID) 125 MCG tablet; Take 1 tablet (125 mcg) by mouth daily  - TSH with free T4 reflex; Future    Benign essential hypertension  Suggest adding amlodipine 5 mg tablet to current regimen.  Recheck blood pressure 3 months  - amLODIPine (NORVASC) 5 MG tablet; Take 1 tablet (5 mg) by mouth daily    Personal history of tobacco use, presenting hazards to health  Recommended routine screening low-dose CT scan due to prior tobacco history  - CT Chest Lung Cancer Screen Low Dose Without; Future    Colon cancer screening  ADVISED COLONOSCOPY FOR ROUTINE PREVENTATIVE CARE.  - Colonscopy Screening  Referral; Future    Cervical cancer screening  Vies patient to consider updating Pap smear and she will schedule an appointment    Morbid obesity (H)  Encouraged ongoing weight loss    CARDIOVASCULAR SCREENING; LDL GOAL LESS THAN 160  Discussed lipid management with better dietary changes prior to initiation of therapy.  Recheck liver function panel and lipid panel as ordered  - Comprehensive metabolic panel; Future  - Lipid Profile; Future    Bipolar affective disorder in remission (H)  Niccoli treated and continuing to follow-up as directed    Severe recurrent major depression without psychotic features (H)  Continue as directed with mental health provider     See Patient Instructions    Return in about 3 months (around 10/6/2022) for Lab Work appointment, BP Recheck.    Prieto Vieira MD  [facility]    Violeta camara is a 51 year old accompanied by her self, presenting for the following health issues:  Recheck Medication      History of Present Illness       Hypertension: She presents for follow up of hypertension.  She does not check blood pressure  regularly outside of the clinic. Outside blood  pressures have been over 140/90. She does not follow a low salt diet.      Today's PHQ-9         PHQ-9 Total Score: 0    PHQ-9 Q9 Thoughts of better off dead/self-harm past 2 weeks :   Not at all    How difficult have these problems made it for you to do your work, take care of things at home, or get along with other people: Not difficult at all       Review of Systems   CONSTITUTIONAL: NEGATIVE for fever, chills, change in weight  EYES: NEGATIVE for vision changes or irritation  ENT/MOUTH: NEGATIVE for ear, mouth and throat problems  RESP: NEGATIVE for significant cough or SOB  CV: NEGATIVE for chest pain, palpitations or peripheral edema  GI: NEGATIVE for nausea, abdominal pain, heartburn, or change in bowel habits  : NEGATIVE for frequency, dysuria, or hematuria  MUSCULOSKELETAL: NEGATIVE for significant arthralgias or myalgia  NEURO: NEGATIVE for weakness, dizziness or paresthesias  ENDOCRINE: NEGATIVE for temperature intolerance, skin/hair changes  HEME: NEGATIVE for bleeding problems  PSYCHIATRIC: NEGATIVE for changes in mood or affect      Objective    BP (!) 140/86   Pulse 86   Temp 97.5  F (36.4  C) (Temporal)   Wt 93.9 kg (207 lb)   LMP 04/12/2017   SpO2 93%   BMI 35.53 kg/m    Body mass index is 35.53 kg/m .  Physical Exam   GENERAL: healthy, alert and no distress  EYES: Eyes grossly normal to inspection, PERRL and conjunctivae and sclerae normal  HENT: ear canals and TM's normal, nose and mouth without ulcers or lesions  NECK: no adenopathy, no asymmetry, masses, or scars and thyroid normal to palpation  RESP: lungs clear to auscultation - no rales, rhonchi or wheezes  CV: regular rate and rhythm, normal S1 S2, no S3 or S4, no murmur, click or rub, no peripheral edema and peripheral pulses strong  MS: no gross musculoskeletal defects noted, no edema  NEURO: Normal strength and tone, mentation intact and speech normal  PSYCH: mentation appears normal, affect normal/bright    TSH   Date Value Ref  Range Status   05/23/2022 9.02 (H) 0.40 - 4.00 mU/L Final   04/28/2021 15.59 (H) 0.40 - 4.00 mU/L Final                     .  ..

## 2022-07-26 DIAGNOSIS — E03.9 ACQUIRED HYPOTHYROIDISM: ICD-10-CM

## 2022-07-28 RX ORDER — LEVOTHYROXINE SODIUM 125 UG/1
125 TABLET ORAL DAILY
Qty: 90 TABLET | Refills: 1 | OUTPATIENT
Start: 2022-07-28

## 2022-07-28 NOTE — TELEPHONE ENCOUNTER
Called [facility] to verify pt has refills on file. Pt just picked up a 3 month supply this month and still has a refill on file.

## 2022-08-12 DIAGNOSIS — I10 BENIGN ESSENTIAL HYPERTENSION: ICD-10-CM

## 2022-08-16 RX ORDER — LISINOPRIL 20 MG/1
40 TABLET ORAL DAILY
Qty: 180 TABLET | Refills: 0 | Status: SHIPPED | OUTPATIENT
Start: 2022-08-16 | End: 2022-12-07

## 2022-08-16 NOTE — TELEPHONE ENCOUNTER
Routing refill request to provider for review/approval because:  Failed protocol due to:   BP Readings from Last 3 Encounters:   07/06/22 (!) 140/86   02/07/22 (!) 152/98   04/28/21 (!) 152/96     Gladys Cook RN

## 2022-11-02 ENCOUNTER — LAB (OUTPATIENT)
Dept: LAB | Facility: CLINIC | Age: 52
End: 2022-11-02
Payer: COMMERCIAL

## 2022-11-02 DIAGNOSIS — Z13.6 CARDIOVASCULAR SCREENING; LDL GOAL LESS THAN 160: ICD-10-CM

## 2022-11-02 DIAGNOSIS — E03.9 ACQUIRED HYPOTHYROIDISM: ICD-10-CM

## 2022-11-02 LAB
ALBUMIN SERPL-MCNC: 4.4 G/DL (ref 3.4–5)
ALP SERPL-CCNC: 81 U/L (ref 40–150)
ALT SERPL W P-5'-P-CCNC: 63 U/L (ref 0–50)
ANION GAP SERPL CALCULATED.3IONS-SCNC: 6 MMOL/L (ref 3–14)
AST SERPL W P-5'-P-CCNC: 50 U/L (ref 0–45)
BILIRUB SERPL-MCNC: 0.5 MG/DL (ref 0.2–1.3)
BUN SERPL-MCNC: 8 MG/DL (ref 7–30)
CALCIUM SERPL-MCNC: 9.4 MG/DL (ref 8.5–10.1)
CHLORIDE BLD-SCNC: 104 MMOL/L (ref 94–109)
CHOLEST SERPL-MCNC: 272 MG/DL
CO2 SERPL-SCNC: 27 MMOL/L (ref 20–32)
CREAT SERPL-MCNC: 0.85 MG/DL (ref 0.52–1.04)
FASTING STATUS PATIENT QL REPORTED: YES
GFR SERPL CREATININE-BSD FRML MDRD: 81 ML/MIN/1.73M2
GLUCOSE BLD-MCNC: 125 MG/DL (ref 70–99)
HDLC SERPL-MCNC: 86 MG/DL
LDLC SERPL CALC-MCNC: 160 MG/DL
NONHDLC SERPL-MCNC: 186 MG/DL
POTASSIUM BLD-SCNC: 4.4 MMOL/L (ref 3.4–5.3)
PROT SERPL-MCNC: 7.9 G/DL (ref 6.8–8.8)
SODIUM SERPL-SCNC: 137 MMOL/L (ref 133–144)
TRIGL SERPL-MCNC: 129 MG/DL
TSH SERPL DL<=0.005 MIU/L-ACNC: 2.47 MU/L (ref 0.4–4)

## 2022-11-02 PROCEDURE — 84443 ASSAY THYROID STIM HORMONE: CPT

## 2022-11-02 PROCEDURE — 36415 COLL VENOUS BLD VENIPUNCTURE: CPT

## 2022-11-02 PROCEDURE — 80053 COMPREHEN METABOLIC PANEL: CPT

## 2022-11-02 PROCEDURE — 80061 LIPID PANEL: CPT

## 2022-11-19 ENCOUNTER — HEALTH MAINTENANCE LETTER (OUTPATIENT)
Age: 52
End: 2022-11-19

## 2022-12-06 DIAGNOSIS — I10 BENIGN ESSENTIAL HYPERTENSION: ICD-10-CM

## 2022-12-07 RX ORDER — LISINOPRIL 20 MG/1
40 TABLET ORAL DAILY
Qty: 180 TABLET | Refills: 0 | Status: SHIPPED | OUTPATIENT
Start: 2022-12-07 | End: 2023-03-09

## 2022-12-07 NOTE — TELEPHONE ENCOUNTER
Routing refill request to provider for review/approval because:  Labs out of range:   BP Readings from Last 3 Encounters:   07/06/22 (!) 140/86   02/07/22 (!) 152/98   04/28/21 (!) 152/96         Justice L. Phoenix, RN    
Pt has a hx of diabetes insipidus.

## 2023-01-02 DIAGNOSIS — E03.9 ACQUIRED HYPOTHYROIDISM: ICD-10-CM

## 2023-01-02 DIAGNOSIS — I10 BENIGN ESSENTIAL HYPERTENSION: ICD-10-CM

## 2023-01-02 RX ORDER — AMLODIPINE BESYLATE 5 MG/1
5 TABLET ORAL DAILY
Qty: 90 TABLET | Refills: 1 | Status: SHIPPED | OUTPATIENT
Start: 2023-01-02 | End: 2023-07-24

## 2023-01-02 RX ORDER — LEVOTHYROXINE SODIUM 125 UG/1
125 TABLET ORAL DAILY
Qty: 90 TABLET | Refills: 1 | Status: SHIPPED | OUTPATIENT
Start: 2023-01-02 | End: 2023-07-24

## 2023-01-16 ENCOUNTER — DOCUMENTATION ONLY (OUTPATIENT)
Dept: LAB | Facility: CLINIC | Age: 53
End: 2023-01-16

## 2023-01-16 DIAGNOSIS — I10 BENIGN ESSENTIAL HYPERTENSION: Primary | ICD-10-CM

## 2023-01-16 DIAGNOSIS — Z13.6 CARDIOVASCULAR SCREENING; LDL GOAL LESS THAN 160: ICD-10-CM

## 2023-01-16 NOTE — PROGRESS NOTES
Pt has upcoming lab appt scheduled.     Appt notes: Glucose and liver function labs    Please place orders or contact pt for rescheduling.     Thank you.

## 2023-02-27 ENCOUNTER — OFFICE VISIT (OUTPATIENT)
Dept: OBGYN | Facility: CLINIC | Age: 53
End: 2023-02-27
Payer: COMMERCIAL

## 2023-02-27 VITALS
BODY MASS INDEX: 37.37 KG/M2 | DIASTOLIC BLOOD PRESSURE: 86 MMHG | WEIGHT: 217.7 LBS | SYSTOLIC BLOOD PRESSURE: 142 MMHG | HEART RATE: 83 BPM

## 2023-02-27 DIAGNOSIS — Z12.4 SCREENING FOR CERVICAL CANCER: ICD-10-CM

## 2023-02-27 DIAGNOSIS — Z12.31 VISIT FOR SCREENING MAMMOGRAM: ICD-10-CM

## 2023-02-27 DIAGNOSIS — Z01.419 ENCOUNTER FOR GYNECOLOGICAL EXAMINATION WITHOUT ABNORMAL FINDING: Primary | ICD-10-CM

## 2023-02-27 PROCEDURE — 87624 HPV HI-RISK TYP POOLED RSLT: CPT | Performed by: OBSTETRICS & GYNECOLOGY

## 2023-02-27 PROCEDURE — 99386 PREV VISIT NEW AGE 40-64: CPT | Performed by: OBSTETRICS & GYNECOLOGY

## 2023-02-27 PROCEDURE — G0145 SCR C/V CYTO,THINLAYER,RESCR: HCPCS | Performed by: OBSTETRICS & GYNECOLOGY

## 2023-02-27 RX ORDER — BUPROPION HYDROCHLORIDE 300 MG/1
TABLET ORAL
COMMUNITY
Start: 2023-02-23 | End: 2024-02-14

## 2023-02-27 NOTE — PROGRESS NOTES
Annual breast and pelvic exam    Ms. Eve Byrd 52 year old G0 presents for her annual breast and pelvic exam.   She otherwise has no issues, complaints or concerns.       Past Medical History:   Diagnosis Date     Asthma      Bipolar disorder (H)      Hypothyroidism 1/6/2014     Thyroid disease        Past Surgical History:   Procedure Laterality Date     LAPAROSCOPIC CHOLECYSTECTOMY  6/24/2014    Procedure: LAPAROSCOPIC CHOLECYSTECTOMY;  Surgeon: Maged Mccabe MD;  Location:  OR       Current Outpatient Medications   Medication     albuterol (PROAIR RESPICLICK) 108 (90 Base) MCG/ACT inhaler     amLODIPine (NORVASC) 5 MG tablet     buPROPion (WELLBUTRIN XL) 300 MG 24 hr tablet     lamoTRIgine (LAMICTAL) 25 MG tablet     levothyroxine (SYNTHROID/LEVOTHROID) 125 MCG tablet     lisinopril (ZESTRIL) 20 MG tablet     Lurasidone HCl (LATUDA PO)     No current facility-administered medications for this visit.       Allergies   Allergen Reactions     Cats      No Known Drug Allergy        Social History     Tobacco Use     Smoking status: Former     Years: 30.00     Types: Cigarettes     Smokeless tobacco: Former     Quit date: 3/31/2017   Substance Use Topics     Alcohol use: Yes     Alcohol/week: 0.0 standard drinks     Comment: Socially      Drug use: No       Family History   Problem Relation Age of Onset     Diabetes Maternal Grandfather      Diabetes Paternal Grandfather      Respiratory Mother         COPD       ROS: 10 point review of systems negative except for pertinent positives stated in the HPI      Exam:   BP (!) 142/86 (BP Location: Left arm, Cuff Size: Adult Regular)   Pulse 83   Wt 98.7 kg (217 lb 11.2 oz)   LMP 04/12/2017   BMI 37.37 kg/m    General Appearance: Well nourished, well developed female, NAD, AOx3  Neurological: Mental Status Normal and Station and Gait Normal   HEET: Atraumatic, normocephalic  Breasts: normal without suspicious masses, skin changes or axillary nodes,  symmetric fibrous changes in both upper outer quadrants, self exam is taught and encouraged.  Pelvic: Normal external female genitalia.  No external lesions, normal hair distribution, no adenopathy. Speculum exam reveals vaginal epithelium well rugated with no abnormal discharge. Cervix appears smooth, pink, with no visible lesions. Bimanual exam reveals normal size uterus, anteverted, non-tender, and mobile. No adnexal masses or tenderness. No cervical motion tenderness.     A/P: Annual breast and pelvic exam  -- clinically normal exam  -- Pap smear collected  -- Mammogram ordered  -- return for annual breast and pelvic exam, or as needed for other gynecological/breast related concerns     Malachi Can MD  [facility]

## 2023-02-27 NOTE — NURSING NOTE
"Chief Complaint   Patient presents with     Gyn Exam     Pap & Mammo due        Initial BP (!) 142/86 (BP Location: Left arm, Cuff Size: Adult Regular)   Pulse 83   Wt 98.7 kg (217 lb 11.2 oz)   LMP 2017   BMI 37.37 kg/m   Estimated body mass index is 37.37 kg/m  as calculated from the following:    Height as of 22: 1.626 m (5' 4\").    Weight as of this encounter: 98.7 kg (217 lb 11.2 oz).  BP completed using cuff size: regular    Questioned patient about current smoking habits.  Pt. quit smoking some time ago.          The following HM Due: mammogram  pap smear      Gretta Salguero, NATALIE on 2023 at 1:07 PM       "

## 2023-03-01 LAB
BKR LAB AP GYN ADEQUACY: NORMAL
BKR LAB AP GYN INTERPRETATION: NORMAL
BKR LAB AP HPV REFLEX: NORMAL
BKR LAB AP PREVIOUS ABNORMAL: NORMAL
PATH REPORT.COMMENTS IMP SPEC: NORMAL
PATH REPORT.COMMENTS IMP SPEC: NORMAL
PATH REPORT.RELEVANT HX SPEC: NORMAL

## 2023-03-03 LAB
HUMAN PAPILLOMA VIRUS 16 DNA: NEGATIVE
HUMAN PAPILLOMA VIRUS 18 DNA: NEGATIVE
HUMAN PAPILLOMA VIRUS FINAL DIAGNOSIS: NORMAL
HUMAN PAPILLOMA VIRUS OTHER HR: NEGATIVE

## 2023-03-09 DIAGNOSIS — I10 BENIGN ESSENTIAL HYPERTENSION: ICD-10-CM

## 2023-03-09 RX ORDER — LISINOPRIL 20 MG/1
40 TABLET ORAL DAILY
Qty: 180 TABLET | Refills: 0 | Status: SHIPPED | OUTPATIENT
Start: 2023-03-09 | End: 2023-06-08

## 2023-03-22 ENCOUNTER — OFFICE VISIT (OUTPATIENT)
Dept: INTERNAL MEDICINE | Facility: CLINIC | Age: 53
End: 2023-03-22
Payer: COMMERCIAL

## 2023-03-22 VITALS
TEMPERATURE: 97.5 F | OXYGEN SATURATION: 99 % | HEIGHT: 64 IN | WEIGHT: 211.9 LBS | BODY MASS INDEX: 36.18 KG/M2 | HEART RATE: 83 BPM | DIASTOLIC BLOOD PRESSURE: 82 MMHG | SYSTOLIC BLOOD PRESSURE: 128 MMHG | RESPIRATION RATE: 14 BRPM

## 2023-03-22 DIAGNOSIS — R20.2 PARESTHESIA: ICD-10-CM

## 2023-03-22 DIAGNOSIS — Z12.31 VISIT FOR SCREENING MAMMOGRAM: ICD-10-CM

## 2023-03-22 DIAGNOSIS — E03.9 ACQUIRED HYPOTHYROIDISM: ICD-10-CM

## 2023-03-22 DIAGNOSIS — Z00.00 ROUTINE GENERAL MEDICAL EXAMINATION AT A HEALTH CARE FACILITY: Primary | ICD-10-CM

## 2023-03-22 DIAGNOSIS — Z12.11 SCREEN FOR COLON CANCER: ICD-10-CM

## 2023-03-22 DIAGNOSIS — Z13.6 CARDIOVASCULAR SCREENING; LDL GOAL LESS THAN 160: ICD-10-CM

## 2023-03-22 DIAGNOSIS — E66.01 MORBID OBESITY (H): ICD-10-CM

## 2023-03-22 DIAGNOSIS — F31.70 BIPOLAR AFFECTIVE DISORDER IN REMISSION (H): ICD-10-CM

## 2023-03-22 LAB
ALBUMIN SERPL BCG-MCNC: 4.7 G/DL (ref 3.5–5.2)
ALP SERPL-CCNC: 87 U/L (ref 35–104)
ALT SERPL W P-5'-P-CCNC: 45 U/L (ref 10–35)
ANION GAP SERPL CALCULATED.3IONS-SCNC: 13 MMOL/L (ref 7–15)
AST SERPL W P-5'-P-CCNC: 52 U/L (ref 10–35)
BILIRUB SERPL-MCNC: 0.3 MG/DL
BUN SERPL-MCNC: 8.5 MG/DL (ref 6–20)
CALCIUM SERPL-MCNC: 9.5 MG/DL (ref 8.6–10)
CHLORIDE SERPL-SCNC: 102 MMOL/L (ref 98–107)
CHOLEST SERPL-MCNC: 209 MG/DL
CREAT SERPL-MCNC: 0.99 MG/DL (ref 0.51–0.95)
DEPRECATED HCO3 PLAS-SCNC: 24 MMOL/L (ref 22–29)
ERYTHROCYTE [DISTWIDTH] IN BLOOD BY AUTOMATED COUNT: 14 % (ref 10–15)
GFR SERPL CREATININE-BSD FRML MDRD: 68 ML/MIN/1.73M2
GLUCOSE SERPL-MCNC: 105 MG/DL (ref 70–99)
HCT VFR BLD AUTO: 39.9 % (ref 35–47)
HDLC SERPL-MCNC: 56 MG/DL
HGB BLD-MCNC: 13.2 G/DL (ref 11.7–15.7)
LDLC SERPL CALC-MCNC: 123 MG/DL
MCH RBC QN AUTO: 33.3 PG (ref 26.5–33)
MCHC RBC AUTO-ENTMCNC: 33.1 G/DL (ref 31.5–36.5)
MCV RBC AUTO: 101 FL (ref 78–100)
NONHDLC SERPL-MCNC: 153 MG/DL
PLATELET # BLD AUTO: 321 10E3/UL (ref 150–450)
POTASSIUM SERPL-SCNC: 4.5 MMOL/L (ref 3.4–5.3)
PROT SERPL-MCNC: 7.6 G/DL (ref 6.4–8.3)
RBC # BLD AUTO: 3.96 10E6/UL (ref 3.8–5.2)
SODIUM SERPL-SCNC: 139 MMOL/L (ref 136–145)
TRIGL SERPL-MCNC: 149 MG/DL
WBC # BLD AUTO: 5.3 10E3/UL (ref 4–11)

## 2023-03-22 PROCEDURE — 99396 PREV VISIT EST AGE 40-64: CPT | Performed by: INTERNAL MEDICINE

## 2023-03-22 PROCEDURE — 36415 COLL VENOUS BLD VENIPUNCTURE: CPT | Performed by: INTERNAL MEDICINE

## 2023-03-22 PROCEDURE — 85027 COMPLETE CBC AUTOMATED: CPT | Performed by: INTERNAL MEDICINE

## 2023-03-22 PROCEDURE — 80061 LIPID PANEL: CPT | Performed by: INTERNAL MEDICINE

## 2023-03-22 PROCEDURE — 80053 COMPREHEN METABOLIC PANEL: CPT | Performed by: INTERNAL MEDICINE

## 2023-03-22 ASSESSMENT — PATIENT HEALTH QUESTIONNAIRE - PHQ9
10. IF YOU CHECKED OFF ANY PROBLEMS, HOW DIFFICULT HAVE THESE PROBLEMS MADE IT FOR YOU TO DO YOUR WORK, TAKE CARE OF THINGS AT HOME, OR GET ALONG WITH OTHER PEOPLE: NOT DIFFICULT AT ALL
SUM OF ALL RESPONSES TO PHQ QUESTIONS 1-9: 6
SUM OF ALL RESPONSES TO PHQ QUESTIONS 1-9: 6

## 2023-03-22 ASSESSMENT — ASTHMA QUESTIONNAIRES
ACT_TOTALSCORE: 25
QUESTION_1 LAST FOUR WEEKS HOW MUCH OF THE TIME DID YOUR ASTHMA KEEP YOU FROM GETTING AS MUCH DONE AT WORK, SCHOOL OR AT HOME: NONE OF THE TIME
QUESTION_2 LAST FOUR WEEKS HOW OFTEN HAVE YOU HAD SHORTNESS OF BREATH: NOT AT ALL
ACT_TOTALSCORE: 25
QUESTION_3 LAST FOUR WEEKS HOW OFTEN DID YOUR ASTHMA SYMPTOMS (WHEEZING, COUGHING, SHORTNESS OF BREATH, CHEST TIGHTNESS OR PAIN) WAKE YOU UP AT NIGHT OR EARLIER THAN USUAL IN THE MORNING: NOT AT ALL
QUESTION_5 LAST FOUR WEEKS HOW WOULD YOU RATE YOUR ASTHMA CONTROL: COMPLETELY CONTROLLED
QUESTION_4 LAST FOUR WEEKS HOW OFTEN HAVE YOU USED YOUR RESCUE INHALER OR NEBULIZER MEDICATION (SUCH AS ALBUTEROL): NOT AT ALL

## 2023-03-22 NOTE — PROGRESS NOTES
SUBJECTIVE:   CC: jax is an 52 year old who presents for preventive health visit.       No flowsheet data found.Patient has been advised of split billing requirements and indicates understanding: Yes     History of Present Illness       Reason for visit:  Physical    She eats 2-3 servings of fruits and vegetables daily.She consumes 1 sweetened beverage(s) daily.She exercises with enough effort to increase her heart rate 9 or less minutes per day.  She exercises with enough effort to increase her heart rate 3 or less days per week.   She is taking medications regularly.  She is not taking prescribed medications regularly due to Not applicable.    Today's PHQ-9         PHQ-9 Total Score: 6    PHQ-9 Q9 Thoughts of better off dead/self-harm past 2 weeks :   Not at all    How difficult have these problems made it for you to do your work, take care of things at home, or get along with other people: Not difficult at all  Healthy Habits:     Getting at least 3 servings of Calcium per day:  NO    Bi-annual eye exam:  Yes    Dental care twice a year:  Yes    Sleep apnea or symptoms of sleep apnea:  None    Diet:  Regular (no restrictions)    Taking medications regularly:  0    Barriers to taking medications:  Not applicable    Medication side effects:  None    PHQ-2 Total Score: 0    Additional concerns today:  Yes    PROBLEMS TO ADD ON...  1. Right upper leg burning, tingling and numbness x 6 months. Worse with laying flat.  No back pain, no weakness no other bowel or bladder change.    Today's PHQ-2 Score:   PHQ-2 ( 1999 Pfizer) 4/28/2021   Q1: Little interest or pleasure in doing things 0   Q2: Feeling down, depressed or hopeless 1   PHQ-2 Score 1   PHQ-2 Total Score (12-17 Years)- Positive if 3 or more points; Administer PHQ-A if positive 1   Q1: Little interest or pleasure in doing things Not at all   Q2: Feeling down, depressed or hopeless Several days   PHQ-2 Score 1           Social History     Tobacco Use      Smoking status: Former     Years: 30.00     Types: Cigarettes     Smokeless tobacco: Former     Quit date: 3/31/2017   Substance Use Topics     Alcohol use: Yes     Alcohol/week: 0.0 standard drinks     Comment: Socially        Alcohol Use 4/28/2021   Prescreen: >3 drinks/day or >7 drinks/week? Yes   AUDIT SCORE  5     Reviewed orders with patient.  Reviewed health maintenance and updated orders accordingly - Yes  Lab work is in process    Breast Cancer Screening:  Any new diagnosis of family breast, ovarian, or bowel cancer? No    FHS-7:   Breast CA Risk Assessment (FHS-7) 5/23/2022 3/22/2023   Did any of your first-degree relatives have breast or ovarian cancer? No No   Did any of your relatives have bilateral breast cancer? No No   Did any man in your family have breast cancer? No No   Did any woman in your family have breast and ovarian cancer? No No   Did any woman in your family have breast cancer before age 50 y? No No   Do you have 2 or more relatives with breast and/or ovarian cancer? No No   Do you have 2 or more relatives with breast and/or bowel cancer? No No       Mammogram Screening: Recommended annual mammography  Pertinent mammograms are reviewed under the imaging tab.    History of abnormal Pap smear: NO - age 30-65 PAP every 5 years with negative HPV co-testing recommended  PAP / HPV Latest Ref Rng & Units 2/27/2023 11/15/2016   PAP   Negative for Intraepithelial Lesion or Malignancy (NILM) -   PAP (Historical) - - NIL   HPV16 Negative Negative Negative   HPV18 Negative Negative Negative   HRHPV Negative Negative Negative     Reviewed and updated as needed this visit by clinical staff   Tobacco  Allergies  Meds              Reviewed and updated as needed this visit by Provider                 Current Outpatient Medications   Medication     albuterol (PROAIR RESPICLICK) 108 (90 Base) MCG/ACT inhaler     amLODIPine (NORVASC) 5 MG tablet     buPROPion (WELLBUTRIN XL) 300 MG 24 hr tablet      lamoTRIgine (LAMICTAL) 25 MG tablet     levothyroxine (SYNTHROID/LEVOTHROID) 125 MCG tablet     lisinopril (ZESTRIL) 20 MG tablet     Lurasidone HCl (LATUDA PO)     No current facility-administered medications for this visit.         Review of Systems  CONSTITUTIONAL: NEGATIVE for fever, chills, change in weight  EYES: NEGATIVE for vision changes or irritation  ENT: NEGATIVE for ear, mouth and throat problems  RESP: NEGATIVE for significant cough or SOB  CV: NEGATIVE for chest pain, palpitations or peripheral edema  GI: NEGATIVE for nausea, abdominal pain, heartburn, or change in bowel habits  : NEGATIVE for unusual urinary or vaginal symptoms. No vaginal bleeding.  MUSCULOSKELETAL: NEGATIVE for significant arthralgias or myalgia  NEURO: NEGATIVE for weakness, dizziness or paresthesias  PSYCHIATRIC: NEGATIVE for changes in mood or affect      OBJECTIVE:   /82   Pulse 83   Temp 97.5  F (36.4  C) (Temporal)   Resp 14   Wt 96.1 kg (211 lb 14.4 oz)   LMP 04/12/2017   SpO2 99%   BMI 36.37 kg/m       Physical Exam  GENERAL: alert and no distress  EYES: Eyes grossly normal to inspection, PERRL and conjunctivae and sclerae normal  HENT: ear canals and TM's normal, nose and mouth without ulcers or lesions  NECK: no adenopathy, no asymmetry, masses, or scars and thyroid normal to palpation  RESP: lungs clear to auscultation - no rales, rhonchi or wheezes  BREAST:  Per OB done  CV: regular rate and rhythm, normal S1 S2, no S3 or S4, no murmur, click or rub  ABDOMEN: obese, soft, nontender, no hepatosplenomegaly, no masses and bowel sounds normal  MS: no gross musculoskeletal defects noted, no edema  NEURO: No focal changes noted  PSYCH: mentation appears normal, affect normal/bright        ASSESSMENT/PLAN:   (Z00.00) Routine general medical examination at a health care facility  (primary encounter diagnosis)  Comment: Advised patient to consider updating shingles vaccine, COVID-vaccine and hepatitis B  etc.  Plan: CBC with platelets, Comprehensive metabolic         panel            (F31.70) Bipolar affective disorder in remission (H)  Comment: Noted as baseline history and currently being medically managed through mental health.  Plan:     (Z13.6) CARDIOVASCULAR SCREENING; LDL GOAL LESS THAN 160  Comment: As ordered as fasting per routine.  Discussed with patient options of more aggressive treatment and consideration of mental health medications and weight gain  Plan: Lipid Profile            (E03.9) Acquired hypothyroidism  Comment:   TSH   Date Value Ref Range Status   11/02/2022 2.47 0.40 - 4.00 mU/L Final   04/28/2021 15.59 (H) 0.40 - 4.00 mU/L Final     Plan: Stable continuing with current observation    (E66.01) Morbid obesity (H)  Comment: Reviewed patient's weight curve.  Discussed dietary  Plan:     (Z12.31) Visit for screening mammogram  Comment: Ordered as routine screening.  Patient states also ordered through gynecology  Plan: *MA Screening Digital Bilateral            (Z12.11) Screen for colon cancer  Comment: Discussed options with patient.  Suggest ordering colonoscopy and patient to schedule accordingly  Plan: Fecal colorectal cancer screen FIT - Future         (S+30), Colonoscopy Screening          Referral        Suggest fit test    (R20.2) Paresthesia  Comment: Discussed with patient.  Symptoms consistent with potential meralgia paresthetica.  Discussed weight reduction, conservative options versus medical treatment.  Patient is wanting to observe at present time and will contact if more problematic.  She reports current symptoms as being more of a nuisance.  Plan:     Patient has been advised of split billing requirements and indicates understanding: Yes      COUNSELING:  Reviewed preventive health counseling, as reflected in patient instructions       Regular exercise       Healthy diet/nutrition        She reports that she has quit smoking. Her smoking use included cigarettes. She  quit smokeless tobacco use about 5 years ago.      Prieto Viiera MD  [facility]

## 2023-04-27 ENCOUNTER — HOSPITAL ENCOUNTER (OUTPATIENT)
Facility: CLINIC | Age: 53
End: 2023-04-27
Attending: INTERNAL MEDICINE | Admitting: INTERNAL MEDICINE
Payer: COMMERCIAL

## 2023-04-27 ENCOUNTER — TELEPHONE (OUTPATIENT)
Dept: GASTROENTEROLOGY | Facility: CLINIC | Age: 53
End: 2023-04-27
Payer: COMMERCIAL

## 2023-04-27 NOTE — TELEPHONE ENCOUNTER
Screening Questions  BLUE  KIND OF PREP RED  LOCATION [review exclusion criteria] GREEN  SEDATION TYPE        Y Are you active on mychart?       SHELLEY Ordering/Referring Provider?        MEDICA What type of coverage do you have?      N Have you had a positive covid test in the last 14 days?     30.9 1. BMI  [BMI 40+ - review exclusion criteria]    Y  2. Are you able to give consent for your medical care? [IF NO,RN REVIEW]          N  3. Are you taking any prescription pain medications on a routine schedule   (ex narcotics: oxycodone, roxicodone, oxycontin,  and percocet)? [RN Review]          3a. EXTENDED PREP What kind of prescription?     N 4. Do you have any chemical dependencies such as alcohol, street drugs, or methadone?        **If yes 3- 5 , please schedule with MAC sedation.**          IF YES TO ANY 6 - 10 - HOSPITAL SETTING ONLY.     N 6.   Do you need assistance transferring?     N 7.   Have you had a heart or lung transplant?    N 8.   Are you currently on dialysis?   N 9.   Do you use daily home oxygen?   N 10. Do you take nitroglycerin?   10a.  If yes, how often?     11. [FEMALES]   Are you currently pregnant?    11a.  If yes, how many weeks? [ Greater than 12 weeks, OR NEEDED]    N 12. Do you have Pulmonary Hypertension? *NEED PAC APPT AT [facility] w/ MAC*     N 13. [review exclusion criteria]  Do you have any implantable devices in your body (pacemaker, defib, LVAD)?    N 14. In the past 6 months, have you had any heart related issues including cardiomyopathy or heart attack?     14a.  If yes, did it require cardiac stenting if so when?     N 15. Have you had a stroke or Transient ischemic attack (TIA - aka  mini stroke ) within 6 months?      N 16. Do you have mod to severe Obstructive Sleep Apnea?  [Hospital only]    N 17. Do you have SEVERE AND UNCONTROLLED asthma? *NEED PAC APPT AT [facility] w/MAC*     18. Are you currently taking any blood thinners?     18a. No. Continue to 19.   18b. Yes/no Blood  "Thinner: No [CONTINUE TO #19]    N 19. Do you take the medication Phentermine?    19a. If yes, \"Hold for 7 days before procedure.  Please consult your prescribing provider if you have questions about holding this medication.\"     N  20. Do you have chronic kidney disease?      N  21. Do you have a diagnosis of diabetes?     Y  22. On a regular basis do you go 3-5 days between bowel movements?      23. Preferred LOCAL Pharmacy for Pre Prescription    [ LIST ONLY ONE PHARMACY]     [id number] - [place], [place] - [address] AT [facility] [address]    - CLOSING REMINDERS -    Informed patient they will need an adult    Cannot take any type of public or medical transportation alone    Conscious Sedation- Needs  for 6 hours after the procedure       MAC/General-Needs  for 24 hours after procedure    Pre-Procedure Covid test to be completed [[facility] PCR Testing Required]    Confirmed Nurse will call to complete assessment       - SCHEDULING DETAILS -  NO Hospital Setting Required? If yes, what is the exclusion?:    JACQUELYN  Surgeon    6/26  Date of Procedure  Lower Endoscopy [Colonoscopy]  Type of Procedure Scheduled  [facility] EXT[facility] - If you answer yes to questions #1, #3, #22 ([place] Leoen and CF pts)Which Colonoscopy Prep was Sent?     CS Sedation Type     N PAC / Pre-op Required                 "

## 2023-05-23 ENCOUNTER — PATIENT OUTREACH (OUTPATIENT)
Dept: CARE COORDINATION | Facility: CLINIC | Age: 53
End: 2023-05-23
Payer: COMMERCIAL

## 2023-06-07 DIAGNOSIS — I10 BENIGN ESSENTIAL HYPERTENSION: ICD-10-CM

## 2023-06-08 DIAGNOSIS — I10 BENIGN ESSENTIAL HYPERTENSION: ICD-10-CM

## 2023-06-08 RX ORDER — LISINOPRIL 20 MG/1
40 TABLET ORAL DAILY
Qty: 180 TABLET | Refills: 0 | Status: SHIPPED | OUTPATIENT
Start: 2023-06-08 | End: 2023-09-22

## 2023-06-09 RX ORDER — LISINOPRIL 20 MG/1
40 TABLET ORAL DAILY
Qty: 180 TABLET | Refills: 0 | OUTPATIENT
Start: 2023-06-09

## 2023-06-12 ENCOUNTER — TELEPHONE (OUTPATIENT)
Dept: GASTROENTEROLOGY | Facility: CLINIC | Age: 53
End: 2023-06-12
Payer: COMMERCIAL

## 2023-06-12 NOTE — TELEPHONE ENCOUNTER
Caller: Eve Byrd    Reason for Reschedule/Cancellation (please be detailed, any staff messages or encounters to note?): Patient request to cancel due to work schedule conflict. Declined rescheduling says she will reschedule when ready.      Prior to reschedule please review:    Ordering Provider:Prieto Vieira MD     Sedation per order:MODERATE    Does patient have any ASC Exclusions, please identify?: NO      Notes on Cancelled Procedure:    Procedure:Lower Endoscopy [Colonoscopy]     Date: 6/26    Location:[facility]; [facility] [place] [address]    Surgeon: JACQUELYN        Rescheduled: No

## 2023-07-19 ENCOUNTER — ANCILLARY PROCEDURE (OUTPATIENT)
Dept: MAMMOGRAPHY | Facility: CLINIC | Age: 53
End: 2023-07-19
Attending: INTERNAL MEDICINE
Payer: COMMERCIAL

## 2023-07-19 DIAGNOSIS — Z12.31 VISIT FOR SCREENING MAMMOGRAM: ICD-10-CM

## 2023-07-19 PROCEDURE — 77067 SCR MAMMO BI INCL CAD: CPT | Mod: TC | Performed by: RADIOLOGY

## 2023-07-19 PROCEDURE — 77063 BREAST TOMOSYNTHESIS BI: CPT | Mod: TC | Performed by: RADIOLOGY

## 2023-07-22 DIAGNOSIS — E03.9 ACQUIRED HYPOTHYROIDISM: ICD-10-CM

## 2023-07-24 DIAGNOSIS — I10 BENIGN ESSENTIAL HYPERTENSION: ICD-10-CM

## 2023-07-24 RX ORDER — AMLODIPINE BESYLATE 5 MG/1
5 TABLET ORAL DAILY
Qty: 90 TABLET | Refills: 1 | Status: SHIPPED | OUTPATIENT
Start: 2023-07-24 | End: 2023-10-26

## 2023-07-24 RX ORDER — LEVOTHYROXINE SODIUM 125 UG/1
TABLET ORAL
Qty: 90 TABLET | Refills: 0 | Status: SHIPPED | OUTPATIENT
Start: 2023-07-24 | End: 2023-10-26

## 2023-09-22 DIAGNOSIS — I10 BENIGN ESSENTIAL HYPERTENSION: ICD-10-CM

## 2023-09-22 RX ORDER — LISINOPRIL 20 MG/1
40 TABLET ORAL DAILY
Qty: 180 TABLET | Refills: 1 | Status: SHIPPED | OUTPATIENT
Start: 2023-09-22 | End: 2024-03-22

## 2023-09-22 NOTE — TELEPHONE ENCOUNTER
"    Requested Prescriptions   Pending Prescriptions Disp Refills    lisinopril (ZESTRIL) 20 MG tablet 180 tablet 0     Sig: Take 2 tablets (40 mg) by mouth daily Take 1 po BID       ACE Inhibitors (Including Combos) Protocol Failed - 9/22/2023  2:58 PM        Failed - Normal serum creatinine on file in past 12 months     Recent Labs   Lab Test 03/22/23  1345   CR 0.99*       Ok to refill medication if creatinine is low          Passed - Blood pressure under 140/90 in past 12 months     BP Readings from Last 3 Encounters:   03/22/23 128/82   02/27/23 (!) 142/86   07/06/22 (!) 140/86                 Passed - Recent (12 mo) or future (30 days) visit within the authorizing provider's specialty     Patient has had an office visit with the authorizing provider or a provider within the authorizing providers department within the previous 12 mos or has a future within next 30 days. See \"Patient Info\" tab in inbasket, or \"Choose Columns\" in Meds & Orders section of the refill encounter.              Passed - Medication is active on med list        Passed - Patient is age 18 or older        Passed - No active pregnancy on record        Passed - Normal serum potassium on file in past 12 months     Recent Labs   Lab Test 03/22/23  1345   POTASSIUM 4.5             Passed - No positive pregnancy test within past 12 months             "

## 2023-10-26 DIAGNOSIS — I10 BENIGN ESSENTIAL HYPERTENSION: ICD-10-CM

## 2023-10-26 DIAGNOSIS — E03.9 ACQUIRED HYPOTHYROIDISM: ICD-10-CM

## 2023-10-27 RX ORDER — LEVOTHYROXINE SODIUM 125 UG/1
125 TABLET ORAL DAILY
Qty: 90 TABLET | Refills: 0 | Status: SHIPPED | OUTPATIENT
Start: 2023-10-27 | End: 2024-01-23

## 2023-10-27 RX ORDER — AMLODIPINE BESYLATE 5 MG/1
5 TABLET ORAL DAILY
Qty: 90 TABLET | Refills: 1 | Status: SHIPPED | OUTPATIENT
Start: 2023-10-27 | End: 2024-04-22

## 2024-01-20 DIAGNOSIS — E03.9 ACQUIRED HYPOTHYROIDISM: ICD-10-CM

## 2024-01-20 NOTE — LETTER
January 24, 2024      Jax Byrd  [address]  [place] [address]        January 24, 2024    Jax Byrd  [address]  [place] [address]        Dear jax,    While refilling your prescription, we noticed that you are due to have a IN PERSON visit with your Provider.  We will refill your prescription for 90 days, but that appointment must be made before any additional refills can be approved.     Taking care of your health is important to us and we look forward to seeing you in the near future.  Please call us at [phone] or [phone] (or use [email or web address]) to schedule.  Please disregard this notice if you have already made an appointment.        Sincerely,          [facility] Team

## 2024-01-23 RX ORDER — LEVOTHYROXINE SODIUM 125 UG/1
125 TABLET ORAL DAILY
Qty: 90 TABLET | Refills: 0 | Status: SHIPPED | OUTPATIENT
Start: 2024-01-23 | End: 2024-04-25

## 2024-02-10 ENCOUNTER — OFFICE VISIT (OUTPATIENT)
Dept: URGENT CARE | Facility: URGENT CARE | Age: 54
End: 2024-02-10
Payer: COMMERCIAL

## 2024-02-10 VITALS
DIASTOLIC BLOOD PRESSURE: 96 MMHG | BODY MASS INDEX: 36.73 KG/M2 | OXYGEN SATURATION: 98 % | HEART RATE: 77 BPM | WEIGHT: 214 LBS | RESPIRATION RATE: 18 BRPM | TEMPERATURE: 98.1 F | SYSTOLIC BLOOD PRESSURE: 148 MMHG

## 2024-02-10 DIAGNOSIS — B02.34 ZOSTER SCLERITIS: Primary | ICD-10-CM

## 2024-02-10 PROCEDURE — 99213 OFFICE O/P EST LOW 20 MIN: CPT | Performed by: NURSE PRACTITIONER

## 2024-02-10 RX ORDER — PREDNISOLONE ACETATE 10 MG/ML
1-2 SUSPENSION/ DROPS OPHTHALMIC EVERY 4 HOURS
Qty: 5 ML | Refills: 0 | Status: SHIPPED | OUTPATIENT
Start: 2024-02-10 | End: 2024-02-17

## 2024-02-10 RX ORDER — VALACYCLOVIR HYDROCHLORIDE 1 G/1
1000 TABLET, FILM COATED ORAL 3 TIMES DAILY
Qty: 21 TABLET | Refills: 0 | Status: SHIPPED | OUTPATIENT
Start: 2024-02-10 | End: 2024-03-27

## 2024-02-11 NOTE — PROGRESS NOTES
Chief Complaint   Patient presents with    Eye Problem     Possible shingles around the right eye. Been going on for the last few weeks. States it's very painful          ICD-10-CM    1. Zoster scleritis  B02.34 valACYclovir (VALTREX) 1000 mg tablet     prednisoLONE acetate (PRED FORTE) 1 % ophthalmic suspension     Adult Eye  Referral      Patient is referred for emergency evaluation by ophthalmology.  She has started on Valtrex and prednisone eyedrops.    Red flag warning signs and when to go to the emergency room discussed.  Reviewed potential adverse reactions to medications.    Subjective     Eve Byrd is an 53 year old female who presents to clinic today for itchy and sore red right eye, Started about 3 weeks ago. [facility] here for possible shingles in the eye. She does have pain in the eye.    No changes in vision.       Objective    BP (!) 148/96 (BP Location: Right arm, Patient Position: Sitting, Cuff Size: Adult Regular)   Pulse 77   Temp 98.1  F (36.7  C) (Oral)   Resp 18   Wt 97.1 kg (214 lb)   LMP 04/12/2017   SpO2 98%   BMI 36.73 kg/m    Nurses notes and VS have been reviewed.    Visual acuity was normal in clinic.    Physical Exam       GENERAL APPEARANCE: healthy appearing, alert     EYES: PERRL and sclera are red as are conjunctiva, vesicles that have crusted over are noted surrounding the inner canthus of the eye onto the nose and beneath the eye     HENT: oral exam benign, mucus membranes intact, without ulcers or lesions     NECK: no adenopathy or asymmetry, thyroid normal to palpation     RESP: lungs clear to auscultation - no rales, rhonchi or wheezes     MS: extremities normal- no gross deformities noted; normal muscle tone.     NEURO: Normal strength and tone, mentation intact and speech normal      Maki Benavides, APRN, CNP  [facility] Provider    The use of Dragon/[employer] dictation services may have been used to construct  the content in this note; any grammatical or spelling errors are non-intentional. Please contact the author of this note directly if you are in need of any clarification.

## 2024-02-12 ENCOUNTER — TELEPHONE (OUTPATIENT)
Dept: OPHTHALMOLOGY | Facility: CLINIC | Age: 54
End: 2024-02-12
Payer: COMMERCIAL

## 2024-02-12 NOTE — TELEPHONE ENCOUNTER
Called and spoke to Eve     Made an appointment for 2/14 @ 10 am with Acute     Provided the clinic address     Wait time     Billing / insurance     Parking / cost     Tasha Vidal Communication Facilitator on 2/12/2024 at 10:31 AM

## 2024-02-14 ENCOUNTER — OFFICE VISIT (OUTPATIENT)
Dept: OPHTHALMOLOGY | Facility: CLINIC | Age: 54
End: 2024-02-14
Attending: OPHTHALMOLOGY
Payer: COMMERCIAL

## 2024-02-14 DIAGNOSIS — H02.886 MEIBOMIAN GLAND DYSFUNCTION (MGD) OF BOTH EYES: ICD-10-CM

## 2024-02-14 DIAGNOSIS — H00.12 CHALAZION OF RIGHT LOWER EYELID: Primary | ICD-10-CM

## 2024-02-14 DIAGNOSIS — B02.34 ZOSTER SCLERITIS: ICD-10-CM

## 2024-02-14 DIAGNOSIS — H02.883 MEIBOMIAN GLAND DYSFUNCTION (MGD) OF BOTH EYES: ICD-10-CM

## 2024-02-14 PROCEDURE — 99203 OFFICE O/P NEW LOW 30 MIN: CPT | Mod: GC | Performed by: OPHTHALMOLOGY

## 2024-02-14 PROCEDURE — 99213 OFFICE O/P EST LOW 20 MIN: CPT | Performed by: STUDENT IN AN ORGANIZED HEALTH CARE EDUCATION/TRAINING PROGRAM

## 2024-02-14 RX ORDER — NEOMYCIN SULFATE, POLYMYXIN B SULFATE, AND DEXAMETHASONE 3.5; 10000; 1 MG/G; [USP'U]/G; MG/G
0.5 OINTMENT OPHTHALMIC 2 TIMES DAILY
Qty: 7 G | Refills: 1 | Status: SHIPPED | OUTPATIENT
Start: 2024-02-14 | End: 2024-04-25

## 2024-02-14 RX ORDER — DOXYCYCLINE HYCLATE 100 MG
TABLET ORAL
Qty: 42 TABLET | Refills: 0 | Status: SHIPPED | OUTPATIENT
Start: 2024-02-14 | End: 2024-03-13

## 2024-02-14 ASSESSMENT — VISUAL ACUITY
OD_SC: 20/30
OD_PH_SC: 20/20
OS_SC: 20/20
METHOD: SNELLEN - LINEAR
OS_SC+: -2

## 2024-02-14 ASSESSMENT — CONF VISUAL FIELD
OS_INFERIOR_TEMPORAL_RESTRICTION: 0
OS_INFERIOR_NASAL_RESTRICTION: 0
OD_SUPERIOR_TEMPORAL_RESTRICTION: 0
OD_SUPERIOR_NASAL_RESTRICTION: 0
OD_NORMAL: 1
OS_SUPERIOR_TEMPORAL_RESTRICTION: 0
OS_SUPERIOR_NASAL_RESTRICTION: 0
METHOD: COUNTING FINGERS
OD_INFERIOR_NASAL_RESTRICTION: 0
OD_INFERIOR_TEMPORAL_RESTRICTION: 0
OS_NORMAL: 1

## 2024-02-14 ASSESSMENT — TONOMETRY
IOP_METHOD: TONOPEN
OD_IOP_MMHG: 16
OS_IOP_MMHG: 15

## 2024-02-14 ASSESSMENT — SLIT LAMP EXAM - LIDS: COMMENTS: MGD

## 2024-02-14 ASSESSMENT — EXTERNAL EXAM - RIGHT EYE: OD_EXAM: NORMAL

## 2024-02-14 ASSESSMENT — EXTERNAL EXAM - LEFT EYE: OS_EXAM: NORMAL

## 2024-02-14 ASSESSMENT — CUP TO DISC RATIO
OS_RATIO: 0.1
OD_RATIO: 0.1

## 2024-02-14 NOTE — PATIENT INSTRUCTIONS
Please perform the following:     Eye drops: (please wait approximately 5 minutes between drops if they contain medication)  -Preservative-free artificial tears 1 drop to both eye(s) at least 4 times a day     -examples include: Refresh Plus, Systane preservative-free, Thera Tears, as well as those made by your favorite stores (just please make sure they say preservative-free on them and are in separate vials/dropperettes)    -please be aware that many of these can be re-capped, so as to reduce waste of these please check your specific tears type      Ointment: (be careful of blurry vision and reduce your chances of falling after placing these, 1/4 inch is approximately the size of a grain of rice)  -Artificial tear ointment 1/4 inch to Both eye(s) at bedtime     -examples include: Refresh PM, Lacrilube, etc.    -you may also use thicker drops such as gel drops that last longer but with caution as they blur your vision and should never be placed immediately before activities such as driving; examples include: Systane gel drops, Genteal gel drops, Refresh Liquigel or Celluvisc    Other:   -Take frequent breaks when you read, watch television, or use a computer. Close your eyes. Do not rub your eyes. Artificial tears may help you when you do these activities.  -Use wrap-around sunglasses to protect your eyes from the sun, wind, and grit  -Use a humidifier to increase the moisture in the air  -Avoid smoke, wind, hair dryers, air conditioners, and aerosol sprays  -Drink adequate amounts of water to avoid dehydration  -Do warm compresses 1-2 times daily. This will help loosen up the oils in your eyelids so they can make it to the surface of your eyeball. Process: Heat up a warm compress (uncooked rice in a new, clean sock) to a safe temperature in the microwave, place it over the closed eye for 5 minutes, remove it from the eye, and then massage your eyelids gently (put your finger on your bottom eyelid and roll up in  the direction of your eyeball, then put your finger on your top eyelid and roll down in the direction of your eyeball). YOU CAN ALSO PURCHASE A JENNIFER MASK OVER THE COUNTER. MAKE SURE YOU ARE APPLYING HEAT TO THE AREA OF CONCERN.   -Gently wash your eyelashes with baby shampoo daily. This will help remove debris from the exit points of the eyelid oil glands.    START OMEGA-3 SUPPLEMENTS DAILY     START DOXYCYCLINE 100 MG TWICE A DAY FOR TWO WEEKS, FOLLOWED BY ONCE DAILY FOR TWO WEEKS

## 2024-02-14 NOTE — PROGRESS NOTES
HPI       New Patient    In right eye.  Associated symptoms include Negative for photophobia, flashes and floaters.  Treatments tried include eye drops.  Pain was noted as 3/10. Additional comments: New patient with Herpes Zoster.             Comments    The patient noted pain in the right eye about two weeks ago.  Patient went to Urgent Care five days ago and was told she had shingles in her eye.  She has less pain but is still aware of discomfort when she blinks.    She is using Prednisolone q4H in the right eye.  She started the Valacyclovir  three times daily on 02/10/24.  ORQUIDEA Camarillo, COA 10:20 AM 02/14/2024                Last edited by Medina Field COA on 2/14/2024 10:20 AM.          [facility]       HPI:   Eve Byrd is a 53 year old female who presents for evaluation of possible Shingles in the right eye. Patient endorses redness and soreness that began 3 weeks ago. Associated with pain. Went to urgent care on 2/10 who noted vesicles around the medial canthus of the right eyes. She was started on prednisolone and Valtrex by urgent care. She is here for follow up.     Overall the patient is doing well. Since starting the medications, she noted improvement in the puffiness and redness. There is some continued itching, but no significant pain.  She denies any changes in vision.     HTN well controlled with meds. No diabetes. No autoimmune conditions or cancers. No sleep apnea.     Past Ocular history:   - Glasses: Yes  - Contact lens wear: None  - Ocular medical history: Myopia and presbyopia  - Ocular surgical history: None  - Current eye drops:   - Prednisolone Q4H right eye   - Valtrex 1 g TID PO    PMH:   Past Medical History:   Diagnosis Date    Asthma     Bipolar disorder (H)     Hypothyroidism 1/6/2014    Thyroid disease          FH: No family history of glaucoma, AMD, or other ocular disease      Review of systems for the eyes was negative other than the  pertinent positives/negatives listed in the HPI.        Assessment & Plan      Eve Byrd is a 53 year old female with the following diagnoses:     # Chalazion right lower eyelid   # Dry eye each eye   # MGD each eye   - Exam shows a chalazion of the right lower eyelid, adjacent to the punctum. Purulent fluid expressed upon gentle compression, but no purulent drainage from the punctum. No vesicular lesions. No dendrites on the cornea, though she has dense inter-palpebral confluent PEE each eye. No cell or flare in AC. No retinitis or vascular sheathing.     Plan:  - Start warm compresses  - Start PFAT QID each eye   - Start Maxitrol ointment BID for two weeks right eye   - Start AT ointment at bedtime each eye thereafter   - Start doxycycline 100 mg BID for two weeks followed by daily for two weeks  - Start lid hygiene each eye       Follow up:  Return in about 4 weeks (around 3/13/2024) for VT, surface check .      Patient seen with Dr. Nuñez    Thank you for entrusting us with your care    Gab Sun M.D.  PGY-3 Resident Physician  Department of Ophthalmology  02/14/24 10:36 AM    Attending Physician Attestation:  Complete documentation of historical and exam elements from today's encounter can be found in the full encounter summary report (not reduplicated in this progress note).  I personally obtained the chief complaint(s) and history of present illness.  I confirmed and edited as necessary the review of systems, past medical/surgical history, family history, social history, and examination findings as documented by others; and I examined the patient myself.  I personally reviewed the relevant tests, images, and reports as documented above.  I formulated and edited as necessary the assessment and plan and discussed the findings and management plan with the patient and family.  - Houston Nuñez MD

## 2024-02-14 NOTE — NURSING NOTE
Chief Complaints and History of Present Illnesses   Patient presents with    New Patient     New patient with Herpes Zoster.     Chief Complaint(s) and History of Present Illness(es)       New Patient              Laterality: right eye    Associated symptoms: Negative for photophobia, flashes and floaters    Treatments tried: eye drops    Pain scale: 3/10    Comments: New patient with Herpes Zoster.              Comments    The patient noted pain in the right eye about two weeks ago.  Patient went to Urgent Care five days ago and was told she had shingles in her eye.  She has less pain but is still aware of discomfort when she blinks.    She is using Prednisolone q4H in the right eye.  She started the Valacyclovir  three times daily on 02/10/24.  Medina Field COA, COA 10:20 AM 02/14/2024

## 2024-02-21 ENCOUNTER — PATIENT OUTREACH (OUTPATIENT)
Dept: CARE COORDINATION | Facility: CLINIC | Age: 54
End: 2024-02-21
Payer: COMMERCIAL

## 2024-02-25 NOTE — LETTER
[facility]  [address] [id number] [phone]            Eve Byrd  [address] [place] [id number]        September 4, 2018    Dear Eve,    While refilling your prescription today, we noticed that you are due to have labs drawn.  We will refill your prescription for 30 days, but a follow-up appointment must be made before any additional refills can be approved.     Taking care of your health is important to us and we look forward to seeing you in the near future.  Please call us at [phone] or [phone] (or use [employer]) to schedule an appointment.     Please disregard this notice if you have already made an appointment.    Sincerely,        [facility]  
Manual Removal

## 2024-03-06 ENCOUNTER — PATIENT OUTREACH (OUTPATIENT)
Dept: CARE COORDINATION | Facility: CLINIC | Age: 54
End: 2024-03-06
Payer: COMMERCIAL

## 2024-03-12 DIAGNOSIS — I10 BENIGN ESSENTIAL HYPERTENSION: ICD-10-CM

## 2024-03-12 RX ORDER — LISINOPRIL 20 MG/1
20 TABLET ORAL 2 TIMES DAILY
Qty: 180 TABLET | Refills: 0 | OUTPATIENT
Start: 2024-03-12

## 2024-03-22 DIAGNOSIS — I10 BENIGN ESSENTIAL HYPERTENSION: ICD-10-CM

## 2024-03-22 RX ORDER — LISINOPRIL 20 MG/1
20 TABLET ORAL 2 TIMES DAILY
Qty: 180 TABLET | Refills: 0 | Status: SHIPPED | OUTPATIENT
Start: 2024-03-22 | End: 2024-04-25

## 2024-03-27 ENCOUNTER — OFFICE VISIT (OUTPATIENT)
Dept: OPHTHALMOLOGY | Facility: CLINIC | Age: 54
End: 2024-03-27
Attending: OPHTHALMOLOGY
Payer: COMMERCIAL

## 2024-03-27 DIAGNOSIS — H01.01B ULCERATIVE BLEPHARITIS OF UPPER AND LOWER EYELIDS OF BOTH EYES: ICD-10-CM

## 2024-03-27 DIAGNOSIS — H02.886 MEIBOMIAN GLAND DYSFUNCTION (MGD) OF BOTH EYES: Primary | ICD-10-CM

## 2024-03-27 DIAGNOSIS — H00.12 CHALAZION OF RIGHT LOWER EYELID: ICD-10-CM

## 2024-03-27 DIAGNOSIS — H01.01A ULCERATIVE BLEPHARITIS OF UPPER AND LOWER EYELIDS OF BOTH EYES: ICD-10-CM

## 2024-03-27 DIAGNOSIS — H02.883 MEIBOMIAN GLAND DYSFUNCTION (MGD) OF BOTH EYES: Primary | ICD-10-CM

## 2024-03-27 PROCEDURE — 99212 OFFICE O/P EST SF 10 MIN: CPT | Performed by: OPHTHALMOLOGY

## 2024-03-27 PROCEDURE — 99213 OFFICE O/P EST LOW 20 MIN: CPT | Performed by: OPHTHALMOLOGY

## 2024-03-27 ASSESSMENT — SLIT LAMP EXAM - LIDS: COMMENTS: 1+ MEIBOMIAN GLAND DYSFUNCTION

## 2024-03-27 ASSESSMENT — TONOMETRY
OD_IOP_MMHG: 18
IOP_METHOD: TONOPEN
OS_IOP_MMHG: 19

## 2024-03-27 ASSESSMENT — EXTERNAL EXAM - LEFT EYE: OS_EXAM: NORMAL

## 2024-03-27 ASSESSMENT — EXTERNAL EXAM - RIGHT EYE: OD_EXAM: NORMAL

## 2024-03-27 ASSESSMENT — VISUAL ACUITY
OD_SC+: -2
OD_SC: 20/30
OD_PH_SC: 20/25
OS_SC+: +1
OS_SC: 20/25
METHOD: SNELLEN - LINEAR

## 2024-03-27 NOTE — NURSING NOTE
Chief Complaints and History of Present Illnesses   Patient presents with    Chalazion Follow Up     Chief Complaint(s) and History of Present Illness(es)       Chalazion Follow Up              Laterality: right lower lid    Associated symptoms: Negative for lid swelling, lid pain, lid redness, red eye, tearing and eye pain    Pain scale: 0/10              Comments    Eve is here to follow up on a chalazion of RLL. She having a surface check today. She says she has been using compresses as directed and feels the lid is getting better.     Mitchell Jain COT 1:02 PM March 27, 2024

## 2024-03-27 NOTE — PROGRESS NOTES
HPI       Chalazion Follow Up    In right lower lid.  Associated symptoms include Negative for lid swelling, lid pain, lid redness, red eye, tearing and eye pain.  Pain was noted as 0/10.             Comments    Eve is here to follow up on a chalazion of RLL. She having a surface check today. She says she has been using compresses as directed and feels the lid is getting better.     Mitchell Jain COT 1:02 PM March 27, 2024             Last edited by Mitchell Jain on 3/27/2024  1:02 PM.         Review of systems for the eyes was negative other than the pertinent positives/negatives listed in the HPI.      Assessment & Plan    HPI:  Eve Byrd is a 53 year old female here for follow up chalazion rll, Meibomian gland dysfunction. Doing much better today.       POHx: presbyopia  PMHx: HTN, hypothryoid  Current Medications: albuterol (PROAIR RESPICLICK) 108 (90 Base) MCG/ACT inhaler, Inhale 1-2 puffs into the lungs every 6 hours as needed for shortness of breath / dyspnea or wheezing  amLODIPine (NORVASC) 5 MG tablet, Take 1 tablet (5 mg) by mouth daily  artificial tears OINT ophthalmic ointment, Place 1 g into both eyes at bedtime  dextran 70-hypromellose (TEARS NATURALE FREE PF) 0.1-0.3 % ophthalmic solution, Place 1 drop into both eyes 4 times daily  lamoTRIgine (LAMICTAL) 25 MG tablet, 1 po BID (Patient taking differently: daily 1 po BID)  levothyroxine (SYNTHROID/LEVOTHROID) 125 MCG tablet, Take 1 tablet (125 mcg) by mouth daily  lisinopril (ZESTRIL) 20 MG tablet, TAKE ONE TABLET BY MOUTH TWICE DAILY  Lurasidone HCl (LATUDA PO), Take 80 mg by mouth daily   neomycin-polymyxin-dexAMETHasone (MAXITROL) 3.5-69146-4.1 ophthalmic ointment, Place 0.1429 Applications (0.5 g) into the right eye 2 times daily    No current facility-administered medications on file prior to visit.    FHx: denies family history of ocular conditions   PSHx: denies history of ocular surgeries       Current Eye Medications:  Maxitrol  ointment twice a day right eye   Pfat four times a day    Warm compress 1-2x     Assessment & Plan:  (H02.883,  H02.886) Meibomian gland dysfunction (MGD) of both eyes  (primary encounter diagnosis)  (H01.01A,  H01.01B) Ulcerative blepharitis of upper and lower eyelids of both eyes  (H00.12) Chalazion of right lower eyelid  Much improved today  Continue AT four times a day and warm compress      Return in about 9 months (around 12/27/2024) for Annual Visit-v/t/d/MRx.        Houston Nuñez MD     Attending Physician Attestation:  Complete documentation of historical and exam elements from today's encounter can be found in the full encounter summary report (not reduplicated in this progress note).  I personally obtained the chief complaint(s) and history of present illness.  I confirmed and edited as necessary the review of systems, past medical/surgical history, family history, social history, and examination findings as documented by others; and I examined the patient myself.  I personally reviewed the relevant tests, images, and reports as documented above.  I formulated and edited as necessary the assessment and plan and discussed the findings and management plan with the patient and family. - Houston Nuñez MD

## 2024-04-21 DIAGNOSIS — I10 BENIGN ESSENTIAL HYPERTENSION: ICD-10-CM

## 2024-04-22 RX ORDER — AMLODIPINE BESYLATE 5 MG/1
5 TABLET ORAL DAILY
Qty: 90 TABLET | Refills: 0 | Status: SHIPPED | OUTPATIENT
Start: 2024-04-22 | End: 2024-07-31

## 2024-04-24 SDOH — HEALTH STABILITY: PHYSICAL HEALTH: ON AVERAGE, HOW MANY DAYS PER WEEK DO YOU ENGAGE IN MODERATE TO STRENUOUS EXERCISE (LIKE A BRISK WALK)?: 0 DAYS

## 2024-04-24 SDOH — HEALTH STABILITY: PHYSICAL HEALTH: ON AVERAGE, HOW MANY MINUTES DO YOU ENGAGE IN EXERCISE AT THIS LEVEL?: 0 MIN

## 2024-04-24 ASSESSMENT — ASTHMA QUESTIONNAIRES
QUESTION_1 LAST FOUR WEEKS HOW MUCH OF THE TIME DID YOUR ASTHMA KEEP YOU FROM GETTING AS MUCH DONE AT WORK, SCHOOL OR AT HOME: NONE OF THE TIME
QUESTION_5 LAST FOUR WEEKS HOW WOULD YOU RATE YOUR ASTHMA CONTROL: COMPLETELY CONTROLLED
QUESTION_3 LAST FOUR WEEKS HOW OFTEN DID YOUR ASTHMA SYMPTOMS (WHEEZING, COUGHING, SHORTNESS OF BREATH, CHEST TIGHTNESS OR PAIN) WAKE YOU UP AT NIGHT OR EARLIER THAN USUAL IN THE MORNING: NOT AT ALL
QUESTION_2 LAST FOUR WEEKS HOW OFTEN HAVE YOU HAD SHORTNESS OF BREATH: NOT AT ALL
ACT_TOTALSCORE: 25
ACT_TOTALSCORE: 25
QUESTION_4 LAST FOUR WEEKS HOW OFTEN HAVE YOU USED YOUR RESCUE INHALER OR NEBULIZER MEDICATION (SUCH AS ALBUTEROL): NOT AT ALL

## 2024-04-24 ASSESSMENT — PATIENT HEALTH QUESTIONNAIRE - PHQ9
SUM OF ALL RESPONSES TO PHQ QUESTIONS 1-9: 5
10. IF YOU CHECKED OFF ANY PROBLEMS, HOW DIFFICULT HAVE THESE PROBLEMS MADE IT FOR YOU TO DO YOUR WORK, TAKE CARE OF THINGS AT HOME, OR GET ALONG WITH OTHER PEOPLE: SOMEWHAT DIFFICULT
SUM OF ALL RESPONSES TO PHQ QUESTIONS 1-9: 5

## 2024-04-24 ASSESSMENT — SOCIAL DETERMINANTS OF HEALTH (SDOH): HOW OFTEN DO YOU GET TOGETHER WITH FRIENDS OR RELATIVES?: ONCE A WEEK

## 2024-04-24 NOTE — COMMUNITY RESOURCES LIST (ENGLISH)
April 24, 2024           YOUR PERSONALIZED LIST OF SERVICES & PROGRAMS           & RECREATION    Sports      [employer]  [address] [place] [address] (Distance: 3.4 miles)  Phone: [phone]  Website: http://www.[employer]/  Language: English  Fee: Free  Accessibility: [facility]      of the [place] - Sports clubs and recreational activities - [place] [facility]  [address] [place] [address] (Distance: 0.6 miles)  Language: English  Fee: Self pay, Sliding scale      [employer]FT[employer]ALL  Website: http://www.[employer]stech.org    Classes/Groups      [facility] [employer] [facility] - Personal training  [address] [place] [address] (Distance: 20.7 miles)  Phone: [phone]  Website: https://www.[employer]/  Language: English, Tunisian  Fee: Sliding scale, Self pay, Insurance      of the [facility] Gym or workout facility - [facility]  [address] [place] [address] (Distance: 0.6 miles)  Language: English  Fee: Free, Insurance, Self pay, Sliding scale      [facility] - Care Coordination (Healthcare only)  Phone: [phone]  Website: https://[employer]  Language: English, Tunisian  Hours: Wed 9:00 AM - 11:30 AM Thu 1:00 PM - 4:00 PM, 5:30 PM - 7:00 PM               IMPORTANT NUMBERS & WEBSITES        Emergency Services  911  .   United Way  211 http://211unitedway.org  .   Poison Control  (394) 703-1988 http://[place]poison.org http://wisconsinpoison.org  .     Suicide and Crisis Lifeline  988 http://988lifeline.org  .   Childhelp National Child Abuse Hotline  231.154.7306 http://Childhelphotline.org   .   National Sexual Assault Hotline  (755) 819-5592 (HOPE) http://Rainn.org   .     National Runaway Safeline  (917) 366-9357 (RUNAWAY) http://[address] [place] [address].org  .   Pregnancy & Postpartum Support  Call/text [phone]  [place]:  http://ppsupportmn.org  [place]: http://RetailVector.com/wi  .   Substance Abuse National Helpline ([facility])  800-782-HELP (9811) http://Findtreatment.gov   .                DISCLAIMER: These resources have been generated via the [employer] Platform. [employer] does not endorse any service providers mentioned in this resource list. [employer] does not guarantee that the services mentioned in this resource list will be available to you or will improve your health or wellness.    [facility]

## 2024-04-25 ENCOUNTER — OFFICE VISIT (OUTPATIENT)
Dept: INTERNAL MEDICINE | Facility: CLINIC | Age: 54
End: 2024-04-25
Payer: COMMERCIAL

## 2024-04-25 VITALS
DIASTOLIC BLOOD PRESSURE: 76 MMHG | WEIGHT: 213 LBS | SYSTOLIC BLOOD PRESSURE: 130 MMHG | TEMPERATURE: 97.8 F | OXYGEN SATURATION: 97 % | RESPIRATION RATE: 16 BRPM | HEART RATE: 63 BPM | HEIGHT: 64 IN | BODY MASS INDEX: 36.37 KG/M2

## 2024-04-25 DIAGNOSIS — Z13.6 CARDIOVASCULAR SCREENING; LDL GOAL LESS THAN 160: ICD-10-CM

## 2024-04-25 DIAGNOSIS — Z12.11 COLON CANCER SCREENING: ICD-10-CM

## 2024-04-25 DIAGNOSIS — F33.2 SEVERE RECURRENT MAJOR DEPRESSION WITHOUT PSYCHOTIC FEATURES (H): ICD-10-CM

## 2024-04-25 DIAGNOSIS — E03.9 ACQUIRED HYPOTHYROIDISM: ICD-10-CM

## 2024-04-25 DIAGNOSIS — Z87.891 PERSONAL HISTORY OF TOBACCO USE, PRESENTING HAZARDS TO HEALTH: ICD-10-CM

## 2024-04-25 DIAGNOSIS — E66.01 MORBID OBESITY (H): ICD-10-CM

## 2024-04-25 DIAGNOSIS — F31.70 BIPOLAR AFFECTIVE DISORDER IN REMISSION (H): ICD-10-CM

## 2024-04-25 DIAGNOSIS — Z00.00 ENCOUNTER FOR ROUTINE ADULT HEALTH EXAMINATION WITHOUT ABNORMAL FINDINGS: Primary | ICD-10-CM

## 2024-04-25 DIAGNOSIS — I10 BENIGN ESSENTIAL HYPERTENSION: ICD-10-CM

## 2024-04-25 DIAGNOSIS — Z12.31 VISIT FOR SCREENING MAMMOGRAM: ICD-10-CM

## 2024-04-25 LAB
ERYTHROCYTE [DISTWIDTH] IN BLOOD BY AUTOMATED COUNT: 13.1 % (ref 10–15)
HCT VFR BLD AUTO: 42.8 % (ref 35–47)
HGB BLD-MCNC: 14.1 G/DL (ref 11.7–15.7)
MCH RBC QN AUTO: 36.4 PG (ref 26.5–33)
MCHC RBC AUTO-ENTMCNC: 32.9 G/DL (ref 31.5–36.5)
MCV RBC AUTO: 111 FL (ref 78–100)
PLATELET # BLD AUTO: 170 10E3/UL (ref 150–450)
RBC # BLD AUTO: 3.87 10E6/UL (ref 3.8–5.2)
WBC # BLD AUTO: 6.4 10E3/UL (ref 4–11)

## 2024-04-25 PROCEDURE — 36415 COLL VENOUS BLD VENIPUNCTURE: CPT | Performed by: INTERNAL MEDICINE

## 2024-04-25 PROCEDURE — 85027 COMPLETE CBC AUTOMATED: CPT | Performed by: INTERNAL MEDICINE

## 2024-04-25 PROCEDURE — 84439 ASSAY OF FREE THYROXINE: CPT | Performed by: INTERNAL MEDICINE

## 2024-04-25 PROCEDURE — 99396 PREV VISIT EST AGE 40-64: CPT | Performed by: INTERNAL MEDICINE

## 2024-04-25 PROCEDURE — 80053 COMPREHEN METABOLIC PANEL: CPT | Performed by: INTERNAL MEDICINE

## 2024-04-25 PROCEDURE — 84443 ASSAY THYROID STIM HORMONE: CPT | Performed by: INTERNAL MEDICINE

## 2024-04-25 PROCEDURE — 99214 OFFICE O/P EST MOD 30 MIN: CPT | Mod: 25 | Performed by: INTERNAL MEDICINE

## 2024-04-25 PROCEDURE — 80061 LIPID PANEL: CPT | Performed by: INTERNAL MEDICINE

## 2024-04-25 RX ORDER — ATENOLOL 50 MG/1
1 TABLET ORAL
COMMUNITY
Start: 2024-04-10

## 2024-04-25 RX ORDER — LEVOTHYROXINE SODIUM 125 UG/1
125 TABLET ORAL DAILY
Qty: 90 TABLET | Refills: 3 | Status: SHIPPED | OUTPATIENT
Start: 2024-04-25 | End: 2024-09-05 | Stop reason: DRUGHIGH

## 2024-04-25 RX ORDER — LISINOPRIL 20 MG/1
20 TABLET ORAL 2 TIMES DAILY
Qty: 180 TABLET | Refills: 3 | Status: SHIPPED | OUTPATIENT
Start: 2024-04-25

## 2024-04-25 RX ORDER — DEXTROMETHORPHAN HYDROBROMIDE, BUPROPION HYDROCHLORIDE 105; 45 MG/1; MG/1
1 TABLET, MULTILAYER, EXTENDED RELEASE ORAL
COMMUNITY
Start: 2024-04-14

## 2024-04-25 NOTE — PROGRESS NOTES
Preventive Care Visit  [facility]  Prieto Vieira MD, Internal Medicine  Apr 25, 2024      Assessment & Plan     Encounter for routine adult health examination without abnormal findings  Advised and recommended updated routine vaccinations which patient will obtain through her pharmacy  - Comprehensive metabolic panel; Future  - CBC with platelets; Future  - Lipid Profile; Future    Benign essential hypertension  Stable continue with current medical management.  Refill medications accordingly.  Follow-up blood pressure check 6  - Comprehensive metabolic panel; Future  - lisinopril (ZESTRIL) 20 MG tablet; Take 1 tablet (20 mg) by mouth 2 times daily  - OFFICE/OUTPT VISIT,EST,LEVL III    Acquired hypothyroidism  Refill thyroid function medication recheck thyroid function panel  - TSH with free T4 reflex; Future  - levothyroxine (SYNTHROID/LEVOTHROID) 125 MCG tablet; Take 1 tablet (125 mcg) by mouth daily  - OFFICE/OUTPT VISIT,EST,LEVL III    CARDIOVASCULAR SCREENING; LDL GOAL LESS THAN 160  Labs ordered as fasting per routine    Bipolar affective disorder in remission (H24)  Following up with mental health provider as directed.  Continue with current meds as ordered    Severe recurrent major depression without psychotic features (H)  Stable and continue on current medical therapy as previously discussed.  Advised patient consider cutting back on alcohol use    Morbid obesity (H)  Encouraged ongoing weight loss and weight reduction    Visit for screening mammogram  Discussed need to update mammography when due    Colon cancer screening  Offered colonoscopy but declined, suggest fit test  - Fecal colorectal cancer screen FIT; Future    Personal history of tobacco use, presenting hazards to health  Suggest low-dose screening CT scan due to tobacco history as noted  - CT Chest Lung Cancer Screen Low Dose Without; Future    The longitudinal plan of care for the diagnosis(es)/condition(s) as  "documented were addressed during this visit. Due to the added complexity in care, I will continue to support jax in the subsequent management and with ongoing continuity of care.        BMI  Estimated body mass index is 36.56 kg/m  as calculated from the following:    Height as of this encounter: 1.626 m (5' 4\").    Weight as of this encounter: 96.6 kg (213 lb).   Weight management plan: Discussed healthy diet and exercise guidelines    Counseling  Appropriate preventive services were discussed with this patient, including applicable screening as appropriate for fall prevention, nutrition, physical activity, Tobacco-use cessation, weight loss and cognition.  Checklist reviewing preventive services available has been given to the patient.  Reviewed patient's diet, addressing concerns and/or questions.   She is at risk for psychosocial distress and has been provided with information to reduce risk.   The patient reports drinking more than one alcoholic drink per day and sometimes engages in binge or excessive drinking. The patient was counseled and given information about possible harmful effects of excessive alcohol intake as well as where to get help for alcohol problems. The patient's PHQ-9 score is consistent with mild depression. She was provided with information regarding depression.       Work on weight loss  Regular exercise    Subjective   jax is a 53 year old, presenting for the following:  Physical       Health Care Directive  Patient does not have a Health Care Directive or Living Will: none     HPI        4/24/2024   General Health   How would you rate your overall physical health? (!) POOR   Feel stress (tense, anxious, or unable to sleep) Only a little   (!) STRESS CONCERN      4/24/2024   Nutrition   Three or more servings of calcium each day? (!) NO   Diet: Regular (no restrictions)   How many servings of fruit and vegetables per day? (!) 0-1   How many sweetened beverages each day? 0-1         " 4/24/2024   Exercise   Days per week of moderate/strenous exercise 0 days   Average minutes spent exercising at this level 0 min   (!) EXERCISE CONCERN      4/24/2024   Social Factors   Frequency of gathering with friends or relatives Once a week   Worry food won't last until get money to buy more No   Food not last or not have enough money for food? No   Do you have housing?  Yes   Are you worried about losing your housing? No   Lack of transportation? No   Unable to get utilities (heat,electricity)? No         4/24/2024   Fall Risk   Fallen 2 or more times in the past year? No   Trouble with walking or balance? No          4/24/2024   Dental   Dentist two times every year? Yes         4/24/2024   TB Screening   Were you born outside of the [place]? No       Today's PHQ-9 Score:       4/24/2024     3:55 PM   PHQ-9 SCORE   PHQ-9 Total Score MyChart 5 (Mild depression)   PHQ-9 Total Score 5         4/24/2024   Substance Use   Alcohol more than 3/day or more than 7/wk Yes   How often do you have a drink containing alcohol 2 to 3 times a week   How many alcohol drinks on typical day 1 or 2   How often do you have 5+ drinks at one occasion Less than monthly   Audit 2/3 Score 1   How often not able to stop drinking once started Never   How often failed to do what normally expected Never   How often needed first drink in am after a heavy drinking session Never   How often feeling of guilt or remorse after drinking Never   How often unable to remember what happened the night before Never   Have you or someone else been injured because of your drinking No   Has anyone been concerned or suggested you cut down on drinking No   TOTAL SCORE - AUDIT 4   Do you use any other substances recreationally? (!) ALCOHOL     Social History     Tobacco Use    Smoking status: Former     Current packs/day: 0.00     Average packs/day: 0.5 packs/day for 34.2 years (17.1 ttl pk-yrs)     Types: Cigarettes     Start date: 1/1/1983     Quit date:  3/1/2017     Years since quittin.1    Smokeless tobacco: Never   Vaping Use    Vaping status: Never Used   Substance Use Topics    Alcohol use: Yes     Comment: Socially     Drug use: No           2023   LAST FHS-7 RESULTS   1st degree relative breast or ovarian cancer No   Any relative bilateral breast cancer No   Any male have breast cancer No   Any ONE woman have BOTH breast AND ovarian cancer No   Any woman with breast cancer before 50yrs No   2 or more relatives with breast AND/OR ovarian cancer No   2 or more relatives with breast AND/OR bowel cancer No       Mammogram Screening - Mammogram every 1-2 years updated in Health Maintenance based on mutual decision making        2024   STI Screening   New sexual partner(s) since last STI/HIV test? No     History of abnormal Pap smear: NO - age 30-65 PAP every 5 years with negative HPV co-testing recommended        Latest Ref Rng & Units 2023     1:08 PM 11/15/2016     3:45 PM 11/15/2016    12:00 AM   PAP / HPV   PAP  Negative for Intraepithelial Lesion or Malignancy (NILM)      PAP (Historical)    NIL    HPV 16 DNA Negative Negative  Negative     HPV 18 DNA Negative Negative  Negative     Other HR HPV Negative Negative  Negative       ASCVD Risk   The 10-year ASCVD risk score (Hamida KRISHNA, et al., 2019) is: 2.6%    Values used to calculate the score:      Age: 53 years      Sex: Female      Is Non- : No      Diabetic: No      Tobacco smoker: No      Systolic Blood Pressure: 130 mmHg      Is BP treated: Yes      HDL Cholesterol: 56 mg/dL      Total Cholesterol: 209 mg/dL      Reviewed and updated as needed this visit by Provider     Meds                Lab work is in process      Review of Systems  CONSTITUTIONAL: NEGATIVE for fever, chills, change in weight  EYES: NEGATIVE for vision changes or irritation  ENT/MOUTH: NEGATIVE for ear, mouth and throat problems  RESP: NEGATIVE for significant cough or SOB  CV:  "NEGATIVE for chest pain, palpitations or peripheral edema  GI: NEGATIVE for nausea, abdominal pain, heartburn, or change in bowel habits  : NEGATIVE for frequency, dysuria, or hematuria  MUSCULOSKELETAL: NEGATIVE for significant arthralgias or myalgia  NEURO: NEGATIVE for weakness, dizziness or paresthesias  ENDOCRINE: NEGATIVE for temperature intolerance, skin/hair changes  HEME: NEGATIVE for bleeding problems  PSYCHIATRIC: NEGATIVE for changes in mood or affect     Objective    Exam  /76   Pulse 63   Temp 97.8  F (36.6  C) (Temporal)   Resp 16   Ht 1.626 m (5' 4\")   Wt 96.6 kg (213 lb)   LMP 04/12/2017   SpO2 97%   BMI 36.56 kg/m     Estimated body mass index is 36.56 kg/m  as calculated from the following:    Height as of this encounter: 1.626 m (5' 4\").    Weight as of this encounter: 96.6 kg (213 lb).    Physical Exam  GENERAL: alert and no distress  EYES: Eyes grossly normal to inspection, PERRL and conjunctivae and sclerae normal  HENT: ear canals and TM's normal, nose and mouth without ulcers or lesions  NECK: no adenopathy, no asymmetry, masses, or scars  RESP: lungs clear to auscultation - no rales, rhonchi or wheezes  CV: regular rate and rhythm, normal S1 S2, no S3 or S4, no murmur, click or rub, no peripheral edema  ABDOMEN: soft, nontender, no hepatosplenomegaly, no masses and bowel sounds normal  MS: no gross musculoskeletal defects noted, no edema  NEURO: Normal strength and tone, mentation intact and speech normal  PSYCH: mentation appears normal, affect normal/bright        Signed Electronically by: Prieto Vieira MD    Answers submitted by the patient for this visit:  Patient Health Questionnaire (Submitted on 4/24/2024)  If you checked off any problems, how difficult have these problems made it for you to do your work, take care of things at home, or get along with other people?: Somewhat difficult  PHQ9 TOTAL SCORE: 5    "

## 2024-04-26 LAB
ALBUMIN SERPL BCG-MCNC: 4.6 G/DL (ref 3.5–5.2)
ALP SERPL-CCNC: 120 U/L (ref 40–150)
ALT SERPL W P-5'-P-CCNC: 30 U/L (ref 0–50)
ANION GAP SERPL CALCULATED.3IONS-SCNC: 10 MMOL/L (ref 7–15)
AST SERPL W P-5'-P-CCNC: 60 U/L (ref 0–45)
BILIRUB SERPL-MCNC: 0.5 MG/DL
BUN SERPL-MCNC: 9.4 MG/DL (ref 6–20)
CALCIUM SERPL-MCNC: 9.3 MG/DL (ref 8.6–10)
CHLORIDE SERPL-SCNC: 102 MMOL/L (ref 98–107)
CHOLEST SERPL-MCNC: 232 MG/DL
CREAT SERPL-MCNC: 1 MG/DL (ref 0.51–0.95)
DEPRECATED HCO3 PLAS-SCNC: 26 MMOL/L (ref 22–29)
EGFRCR SERPLBLD CKD-EPI 2021: 67 ML/MIN/1.73M2
FASTING STATUS PATIENT QL REPORTED: YES
GLUCOSE SERPL-MCNC: 105 MG/DL (ref 70–99)
HDLC SERPL-MCNC: 66 MG/DL
LDLC SERPL CALC-MCNC: 143 MG/DL
NONHDLC SERPL-MCNC: 166 MG/DL
POTASSIUM SERPL-SCNC: 4.8 MMOL/L (ref 3.4–5.3)
PROT SERPL-MCNC: 7.9 G/DL (ref 6.4–8.3)
SODIUM SERPL-SCNC: 138 MMOL/L (ref 135–145)
T4 FREE SERPL-MCNC: 1.22 NG/DL (ref 0.9–1.7)
TRIGL SERPL-MCNC: 114 MG/DL
TSH SERPL DL<=0.005 MIU/L-ACNC: 12.9 UIU/ML (ref 0.3–4.2)

## 2024-05-06 ENCOUNTER — VIRTUAL VISIT (OUTPATIENT)
Dept: INTERNAL MEDICINE | Facility: CLINIC | Age: 54
End: 2024-05-06
Payer: COMMERCIAL

## 2024-05-06 DIAGNOSIS — Z12.11 COLON CANCER SCREENING: ICD-10-CM

## 2024-05-06 DIAGNOSIS — Z13.6 CARDIOVASCULAR SCREENING; LDL GOAL LESS THAN 160: ICD-10-CM

## 2024-05-06 DIAGNOSIS — E03.9 ACQUIRED HYPOTHYROIDISM: Primary | ICD-10-CM

## 2024-05-06 DIAGNOSIS — I10 BENIGN ESSENTIAL HYPERTENSION: ICD-10-CM

## 2024-05-06 PROCEDURE — 99442 PR PHYSICIAN TELEPHONE EVALUATION 11-20 MIN: CPT | Mod: 93 | Performed by: INTERNAL MEDICINE

## 2024-05-06 RX ORDER — LEVOTHYROXINE SODIUM 137 UG/1
137 TABLET ORAL DAILY
Qty: 90 TABLET | Refills: 0 | Status: SHIPPED | OUTPATIENT
Start: 2024-05-06

## 2024-05-06 NOTE — PROGRESS NOTES
jax is a 53 year old who is being evaluated via a billable telephone visit.    What phone number would you like to be contacted at? [phone]  How would you like to obtain your AVS? Julita  Originating Location (pt. Location): Home    Distant Location (provider location):  On-site    Assessment & Plan     Acquired hypothyroidism  TSH   Date Value Ref Range Status   04/25/2024 12.90 (H) 0.30 - 4.20 uIU/mL Final   11/02/2022 2.47 0.40 - 4.00 mU/L Final   04/28/2021 15.59 (H) 0.40 - 4.00 mU/L Final     - levothyroxine (SYNTHROID/LEVOTHROID) 137 MCG tablet; Take 1 tablet (137 mcg) by mouth daily  - TSH with free T4 reflex; Future    Benign essential hypertension  Stable on therapy    CARDIOVASCULAR SCREENING; LDL GOAL LESS THAN 160  Discussed dietary changes  - Lipid panel reflex to direct LDL Fasting; Future    (Z12.11) Colon cancer screening  Comment: ADVISED COLONOSCOPY FOR ROUTINE PREVENTATIVE CARE.  Plan: Colonoscopy Screening  Referral          See Patient Instructions    Subjective   jax is a 53 year old, presenting for the following health issues:  Results    Follow up 4/25/24 thyroid and cholesterol results     History of Present Illness       Hyperlipidemia:  She presents for follow up of hyperlipidemia.   She is not taking medication to lower cholesterol. She is not having myalgia or other side effects to statin medications.    Hypothyroidism:     Since last visit, patient describes the following symptoms::  None    She eats 0-1 servings of fruits and vegetables daily.She consumes 1 sweetened beverage(s) daily.She exercises with enough effort to increase her heart rate 9 or less minutes per day.  She exercises with enough effort to increase her heart rate 3 or less days per week.   She is taking medications regularly.     Current Outpatient Medications   Medication Sig Dispense Refill    albuterol (PROAIR RESPICLICK) 108 (90 Base) MCG/ACT inhaler Inhale 1-2 puffs into the lungs every 6 hours as  needed for shortness of breath / dyspnea or wheezing 1 each 0    amLODIPine (NORVASC) 5 MG tablet Take 1 tablet (5 mg) by mouth daily 90 tablet 0    artificial tears OINT ophthalmic ointment Place 1 g into both eyes at bedtime 7 g 11    atenolol (TENORMIN) 50 MG tablet Take 1 tablet by mouth daily at 2 pm      AUVELITY  MG TBCR Take 1 tablet by mouth daily at 2 pm      dextran 70-hypromellose (TEARS NATURALE FREE PF) 0.1-0.3 % ophthalmic solution Place 1 drop into both eyes 4 times daily 36 each 11    lamoTRIgine (LAMICTAL) 25 MG tablet 1 po BID (Patient taking differently: daily 1 po BID) 60 tablet 0    levothyroxine (SYNTHROID/LEVOTHROID) 125 MCG tablet Take 1 tablet (125 mcg) by mouth daily 90 tablet 3    lisinopril (ZESTRIL) 20 MG tablet Take 1 tablet (20 mg) by mouth 2 times daily 180 tablet 3    Lurasidone HCl (LATUDA PO) Take 80 mg by mouth daily        No current facility-administered medications for this visit.       Review of Systems  CONSTITUTIONAL: NEGATIVE for fever, chills, change in weight  EYES: NEGATIVE for vision changes or irritation  ENT/MOUTH: NEGATIVE for ear, mouth and throat problems  RESP: NEGATIVE for significant cough or SOB  CV: NEGATIVE for chest pain, palpitations or peripheral edema  GI: NEGATIVE for nausea, abdominal pain, heartburn, or change in bowel habits  : NEGATIVE for frequency, dysuria, or hematuria  MUSCULOSKELETAL: NEGATIVE for significant arthralgias or myalgia  NEURO: NEGATIVE for weakness, dizziness or paresthesias  ENDOCRINE: NEGATIVE for temperature intolerance, skin/hair changes  HEME: NEGATIVE for bleeding problems  PSYCHIATRIC: NEGATIVE for changes in mood or affect      Objective           Vitals:  No vitals were obtained today due to virtual visit.    Physical Exam   General: Alert and no distress //Respiratory: No audible wheeze, cough, or shortness of breath // Psychiatric:  Appropriate affect, tone, and pace of words      TSH   Date Value Ref Range  Status   04/25/2024 12.90 (H) 0.30 - 4.20 uIU/mL Final   11/02/2022 2.47 0.40 - 4.00 mU/L Final   04/28/2021 15.59 (H) 0.40 - 4.00 mU/L Final     LDL Cholesterol Calculated   Date Value Ref Range Status   04/25/2024 143 (H) <=100 mg/dL Final   04/28/2021 183 (H) <100 mg/dL Final     Comment:     Above desirable:  100-129 mg/dl  Borderline High:  130-159 mg/dL  High:             160-189 mg/dL  Very high:       >189 mg/dl             Phone call duration: 14 minutes  Signed Electronically by: Prieto Vieira MD

## 2024-05-28 ENCOUNTER — ANCILLARY PROCEDURE (OUTPATIENT)
Dept: CT IMAGING | Facility: CLINIC | Age: 54
End: 2024-05-28
Attending: INTERNAL MEDICINE
Payer: COMMERCIAL

## 2024-05-28 DIAGNOSIS — Z87.891 PERSONAL HISTORY OF TOBACCO USE, PRESENTING HAZARDS TO HEALTH: ICD-10-CM

## 2024-05-28 PROCEDURE — 71271 CT THORAX LUNG CANCER SCR C-: CPT

## 2024-06-06 PROCEDURE — 82274 ASSAY TEST FOR BLOOD FECAL: CPT | Performed by: INTERNAL MEDICINE

## 2024-06-10 LAB — HEMOCCULT STL QL IA: NEGATIVE

## 2024-06-21 ENCOUNTER — TELEPHONE (OUTPATIENT)
Dept: GASTROENTEROLOGY | Facility: CLINIC | Age: 54
End: 2024-06-21
Payer: COMMERCIAL

## 2024-06-21 NOTE — TELEPHONE ENCOUNTER
"Endoscopy Scheduling Screen    Have you had a positive Covid test in the last 14 days?  No    What is your communication preference for Instructions and/or Bowel Prep?   MyChart    What insurance is in the chart?  Other:  [employer]    Ordering/Referring Provider:     GARETT ROSA      (If ordering provider performs procedure, schedule with ordering provider unless otherwise instructed. )    BMI: Estimated body mass index is 36.56 kg/m  as calculated from the following:    Height as of 4/25/24: 1.626 m (5' 4\").    Weight as of 4/25/24: 96.6 kg (213 lb).     Sedation Ordered  moderate sedation.   If patient BMI > 50 do not schedule in ASC.    If patient BMI > 45 do not schedule at [facility].    Are you taking methadone or Suboxone?  No    Have you had difficulties, pain, or discomfort during past endoscopy procedures?  No    Are you taking any prescription medications for pain 3 or more times per week?   NO, No RN review required.    Do you have a history of malignant hyperthermia?  No    (Females) Are you currently pregnant?        Have you been diagnosed or told you have pulmonary hypertension?   No    Do you have an LVAD?  No    Have you been told you have moderate to severe sleep apnea?  No    Have you been told you have COPD, asthma, or any other lung disease?  Yes     What breathing problems do you have?  Asthma     Do you use home oxygen?  No    Have your breathing problems required an ED visit or hospitalization in the last year?  No    Do you have any heart conditions?  No     Have you ever had or are you waiting for an organ transplant?  No. Continue scheduling, no site restrictions.    Have you had a stroke or transient ischemic attack (TIA aka \"mini stroke\" in the last 6 months?   No    Have you been diagnosed with or been told you have cirrhosis of the liver?   No    Are you currently on dialysis?   No    Do you need assistance transferring?   No    BMI: Estimated body mass index is 36.56 kg/m  as calculated " "from the following:    Height as of 4/25/24: 1.626 m (5' 4\").    Weight as of 4/25/24: 96.6 kg (213 lb).     Is patients BMI > 40 and scheduling location [facility]?  No    Do you take an injectable medication for weight loss or diabetes (excluding insulin)?  No    Do you take the medication Naltrexone?  No    Do you take blood thinners?  No       Prep   Are you currently on dialysis or do you have chronic kidney disease?  No    Do you have a diagnosis of diabetes?  No    Do you have a diagnosis of cystic fibrosis (CF)?  No    On a regular basis do you go 3 -5 days between bowel movements?  No    BMI > 40?  No    Preferred Pharmacy:    [id number] [address]  [address] [address]  Phone: [phone] Fax: [phone]    Final Scheduling Details     Procedure scheduled  Colonoscopy    Surgeon:  JACQUELYN     Date of procedure:  11/4     Pre-OP / PAC:   No - Not required for this site.    Location  [facility] - Per order.    Sedation   Moderate Sedation - Per order.      Patient Reminders:   You will receive a call from a Nurse to review instructions and health history.  This assessment must be completed prior to your procedure.  Failure to complete the Nurse assessment may result in the procedure being cancelled.      On the day of your procedure, please designate an adult(s) who can drive you home stay with you for the next 24 hours. The medicines used in the exam will make you sleepy. You will not be able to drive.      You cannot take public transportation, ride share services, or non-medical taxi service without a responsible caregiver.  Medical transport services are allowed with the requirement that a responsible caregiver will receive you at your destination.  We require that drivers and caregivers are confirmed prior to your procedure.  "

## 2024-07-12 ENCOUNTER — VIRTUAL VISIT (OUTPATIENT)
Dept: INTERNAL MEDICINE | Facility: CLINIC | Age: 54
End: 2024-07-12
Payer: COMMERCIAL

## 2024-07-12 DIAGNOSIS — R21 RASH: Primary | ICD-10-CM

## 2024-07-12 PROCEDURE — 99213 OFFICE O/P EST LOW 20 MIN: CPT | Mod: 95 | Performed by: INTERNAL MEDICINE

## 2024-07-12 RX ORDER — TRIAMCINOLONE ACETONIDE 1 MG/G
CREAM TOPICAL 2 TIMES DAILY PRN
Qty: 30 G | Refills: 0 | Status: SHIPPED | OUTPATIENT
Start: 2024-07-12

## 2024-07-12 NOTE — PROGRESS NOTES
Eve is a 53 year old who is being evaluated via a billable video visit.    How would you like to obtain your AVS? MyChart  If the video visit is dropped, the invitation should be resent by: Text to cell phone: [phone]  Will anyone else be joining your video visit? No    Assessment & Plan   Rash  DDX includes eczema vs topical dermatitis (though no known new exposures) vs other. Has trial OTC steroid cream, will try stronger steroid cream. If no improvement, then likely needs derm consult.  - triamcinolone (KENALOG) 0.1 % external cream; Apply topically 2 times daily as needed for irritation    F/u with regular PCP at regular interval or sooner PRN    Signed Electronically by:  Migel Benral MD, MPH  [facility]  Internal Medicine    Subjective   Eve is a 53 year old who presents for a same day acute care virtual video visit with chief concern of: rash. This is the first time I have met Eve, who typically sees Dr. Vieira in our clinic. Rash ongoing for months. Started on hands. Now on neck. Very itchy. Tried Gold Bond Eczema cream without much relief. Not present elsewhere on body.        Objective       Vitals: No vitals were obtained today due to virtual visit.    Physical Exam   GENERAL: alert and no distress  EYES: Eyes grossly normal to inspection.  No discharge or erythema, or obvious scleral/conjunctival abnormalities.  RESP: No audible wheeze, cough, or visible cyanosis.    SKIN: Maculopapular rash on anterior R neck. No other significant rash, abnormal pigmentation or lesions.  NEURO: Cranial nerves grossly intact.  Mentation and speech appropriate for age.  PSYCH: Appropriate affect, tone, and pace of words    Video-Visit Details  Type of service: Video Visit   Originating Location (pt. Location): Home  Distant Location (provider location):  Off-site  Platform used for Video Visit: [employer]

## 2024-07-30 DIAGNOSIS — I10 BENIGN ESSENTIAL HYPERTENSION: ICD-10-CM

## 2024-07-31 RX ORDER — AMLODIPINE BESYLATE 5 MG/1
5 TABLET ORAL DAILY
Qty: 90 TABLET | Refills: 1 | Status: SHIPPED | OUTPATIENT
Start: 2024-07-31

## 2024-08-14 ENCOUNTER — LAB (OUTPATIENT)
Dept: LAB | Facility: CLINIC | Age: 54
End: 2024-08-14
Payer: COMMERCIAL

## 2024-08-14 DIAGNOSIS — E03.9 ACQUIRED HYPOTHYROIDISM: ICD-10-CM

## 2024-08-14 DIAGNOSIS — Z13.6 CARDIOVASCULAR SCREENING; LDL GOAL LESS THAN 160: ICD-10-CM

## 2024-08-14 LAB
CHOLEST SERPL-MCNC: 219 MG/DL
FASTING STATUS PATIENT QL REPORTED: YES
HDLC SERPL-MCNC: 62 MG/DL
LDLC SERPL CALC-MCNC: 134 MG/DL
NONHDLC SERPL-MCNC: 157 MG/DL
T4 FREE SERPL-MCNC: 1.48 NG/DL (ref 0.9–1.7)
TRIGL SERPL-MCNC: 115 MG/DL
TSH SERPL DL<=0.005 MIU/L-ACNC: 7.3 UIU/ML (ref 0.3–4.2)

## 2024-08-14 PROCEDURE — 80061 LIPID PANEL: CPT

## 2024-08-14 PROCEDURE — 84439 ASSAY OF FREE THYROXINE: CPT

## 2024-08-14 PROCEDURE — 84443 ASSAY THYROID STIM HORMONE: CPT

## 2024-08-14 PROCEDURE — 36415 COLL VENOUS BLD VENIPUNCTURE: CPT

## 2024-08-20 ENCOUNTER — TELEPHONE (OUTPATIENT)
Dept: INTERNAL MEDICINE | Facility: CLINIC | Age: 54
End: 2024-08-20
Payer: COMMERCIAL

## 2024-08-20 NOTE — TELEPHONE ENCOUNTER
Patient Contact    Attempt # 1    Was call answered?  No.  Left message on voicemail with information to call me back.    Upon callback, please review the following result note with pt from her 8/14 labs:        Qiana Jaime RN

## 2024-09-05 ENCOUNTER — VIRTUAL VISIT (OUTPATIENT)
Dept: INTERNAL MEDICINE | Facility: CLINIC | Age: 54
End: 2024-09-05
Payer: COMMERCIAL

## 2024-09-05 DIAGNOSIS — J45.41 MODERATE PERSISTENT ASTHMA WITH EXACERBATION: ICD-10-CM

## 2024-09-05 DIAGNOSIS — Z13.6 CARDIOVASCULAR SCREENING; LDL GOAL LESS THAN 160: ICD-10-CM

## 2024-09-05 DIAGNOSIS — E03.9 ACQUIRED HYPOTHYROIDISM: Primary | ICD-10-CM

## 2024-09-05 DIAGNOSIS — R05.1 ACUTE COUGH: ICD-10-CM

## 2024-09-05 PROCEDURE — 99442 PR PHYSICIAN TELEPHONE EVALUATION 11-20 MIN: CPT | Mod: GT | Performed by: INTERNAL MEDICINE

## 2024-09-05 RX ORDER — ALBUTEROL SULFATE 90 UG/1
1-2 POWDER, METERED RESPIRATORY (INHALATION) EVERY 6 HOURS PRN
Qty: 1 EACH | Refills: 1 | Status: SHIPPED | OUTPATIENT
Start: 2024-09-05

## 2024-09-05 RX ORDER — LEVOTHYROXINE SODIUM 150 UG/1
150 TABLET ORAL DAILY
Qty: 90 TABLET | Refills: 1 | Status: SHIPPED | OUTPATIENT
Start: 2024-09-05

## 2024-09-05 RX ORDER — CODEINE PHOSPHATE AND GUAIFENESIN 10; 100 MG/5ML; MG/5ML
1-2 SOLUTION ORAL EVERY 4 HOURS PRN
Qty: 118 ML | Refills: 0 | Status: SHIPPED | OUTPATIENT
Start: 2024-09-05

## 2024-09-05 RX ORDER — ATORVASTATIN CALCIUM 10 MG/1
10 TABLET, FILM COATED ORAL DAILY
Qty: 90 TABLET | Refills: 1 | Status: CANCELLED | OUTPATIENT
Start: 2024-09-05

## 2024-09-05 NOTE — PROGRESS NOTES
Eve is a 54 year old who is being evaluated via a billable telephone visit.    What phone number would you like to be contacted at? [phone]  How would you like to obtain your AVS? Julita  Originating Location (pt. Location): Home    Distant Location (provider location):  On-site    Assessment & Plan     Acquired hypothyroidism  Discussed with patient that I would like to see her TSH normalized to see if it has a positive impact on her lipid panel without having to start statin therapies.  She is interested in statins at the present time.  Increase levothyroxine to 150 mcg and recheck TSH in 3 months.  I did inform patient she may need alternate day dosing of 137 and 150 mg doses  - levothyroxine (SYNTHROID/LEVOTHROID) 150 MCG tablet; Take 1 tablet (150 mcg) by mouth daily.  - TSH with free T4 reflex; Future    CARDIOVASCULAR SCREENING; LDL GOAL LESS THAN 160  Reviewed lipid panel with patient.  Discussed treatment options in regards to lipid management.  - Hepatic function panel; Future  - Lipid Profile; Future    Moderate persistent asthma with exacerbation  Filled per patient request.  - albuterol (PROAIR RESPICLICK) 108 (90 Base) MCG/ACT inhaler; Inhale 1-2 puffs into the lungs every 6 hours as needed for shortness of breath or wheezing.    Acute cough  Short prescription for Robitussin AC given.  PDMP reviewed.  Patient was advised of risks.  - albuterol (PROAIR RESPICLICK) 108 (90 Base) MCG/ACT inhaler; Inhale 1-2 puffs into the lungs every 6 hours as needed for shortness of breath or wheezing.  - guaiFENesin-codeine (ROBITUSSIN AC) 100-10 MG/5ML solution; Take 5-10 mLs by mouth every 4 hours as needed for cough.    See Patient Instructions    Subjective   Eve is a 54 year old, presenting for the following health issues:  Recheck Medication    Follow up thyroid and cholesterol lab results.    She has been on the 1.25 mcg dose of levothyroxine which was increased to the 137 mcg dose based on her April  2024 TSH level.  We reviewed her most recent levels which demonstrated subclinical hypothyroidism in the setting of some persistent hyperlipidemia.    Component      Latest Ref Rng 11/2/2022  12:54 PM 4/25/2024  1:35 PM 8/14/2024  1:44 PM   TSH      0.40 - 4.00 mU/L 2.47      TSH      0.30 - 4.20 uIU/mL  12.90 (H)  7.30 (H)    T4 Free      0.90 - 1.70 ng/dL  1.22  1.48         LDL Cholesterol Calculated   Date Value Ref Range Status   08/14/2024 134 (H) <=100 mg/dL Final   04/28/2021 183 (H) <100 mg/dL Final     Comment:     Above desirable:  100-129 mg/dl  Borderline High:  130-159 mg/dL  High:             160-189 mg/dL  Very high:       >189 mg/dl           History of Present Illness       Hyperlipidemia:  She presents for follow up of hyperlipidemia.   She is not taking medication to lower cholesterol. She is not having myalgia or other side effects to statin medications.    Hypothyroidism:     Since last visit, patient describes the following symptoms::  None    She eats 0-1 servings of fruits and vegetables daily.She consumes 1 sweetened beverage(s) daily.She exercises with enough effort to increase her heart rate 9 or less minutes per day.  She exercises with enough effort to increase her heart rate 3 or less days per week.   She is taking medications regularly.       States that she has had an acute cough which has been somewhat bothersome for her would like a refill of her inhaler and a short prescription for some Robitussin with codeine to see if it can help make her a little bit more uncomfortable in the evening.    Review of Systems  CONSTITUTIONAL: NEGATIVE for fever, chills, change in weight  EYES: NEGATIVE for vision changes or irritation  CV: NEGATIVE for chest pain, palpitations or peripheral edema  GI: NEGATIVE for nausea, abdominal pain, heartburn, or change in bowel habits  : NEGATIVE for frequency, dysuria, or hematuria  NEURO: NEGATIVE for weakness, dizziness or paresthesias  HEME: NEGATIVE  for bleeding problems  PSYCHIATRIC: NEGATIVE for changes in mood or affect      Objective    Vitals - Patient Reported  Weight (Patient Reported): 90.7 kg (200 lb)      Vitals:  No vitals were obtained today due to virtual visit.    Physical Exam   General: Alert and no distress //Respiratory: No audible wheeze, cough, or shortness of breath // Psychiatric:  Appropriate affect, tone, and pace of words        Phone call duration: 15 minutes  Signed Electronically by: Prieto Vieira MD

## 2024-09-06 ENCOUNTER — TELEPHONE (OUTPATIENT)
Dept: INTERNAL MEDICINE | Facility: CLINIC | Age: 54
End: 2024-09-06
Payer: COMMERCIAL

## 2024-09-06 NOTE — TELEPHONE ENCOUNTER
Prior Authorization Retail Medication Request    Medication/Dose: albuterol (PROAIR RESPICLICK) 108 (90 Base) MCG/ACT inhaler  Diagnosis and ICD code (if different than what is on RX):  J45.41,R05.1  New/renewal/insurance change PA/secondary ins. PA:  Previously Tried and Failed:    Rationale:      Insurance   Primary: [employer]  Insurance ID:  [id number]    Secondary (if applicable):  Insurance ID:      Pharmacy Information (if different than what is on RX)  Name:  [facility]  Phone:  [phone]  Fax:[phone]

## 2024-09-10 NOTE — TELEPHONE ENCOUNTER
Prior Authorization Approval    Medication: PROAIR RESPICLICK 108 (90 BASE) MCG/ACT IN AEPB  Authorization Effective Date: 8/10/2024  Authorization Expiration Date: 9/10/2025  Approved Dose/Quantity:   Reference #:     Insurance Company: [employer] Non-Specialty PA's - Phone [phone] Fax [phone]  Expected CoPay: $    CoPay Card Available:      Financial Assistance Needed:   Which Pharmacy is filling the prescription: [id number] - [facility] [address]  Pharmacy Notified: Yes  Patient Notified: **Instructed pharmacy to notify patient when script is ready to /ship.**

## 2024-09-23 DIAGNOSIS — H00.12 CHALAZION OF RIGHT LOWER EYELID: ICD-10-CM

## 2024-09-23 DIAGNOSIS — H02.886 MEIBOMIAN GLAND DYSFUNCTION (MGD) OF BOTH EYES: ICD-10-CM

## 2024-09-23 DIAGNOSIS — H02.883 MEIBOMIAN GLAND DYSFUNCTION (MGD) OF BOTH EYES: ICD-10-CM

## 2024-10-21 ENCOUNTER — TELEPHONE (OUTPATIENT)
Dept: GASTROENTEROLOGY | Facility: CLINIC | Age: 54
End: 2024-10-21
Payer: COMMERCIAL

## 2024-10-21 NOTE — TELEPHONE ENCOUNTER
Caller: Eve    Reason for Reschedule/Cancellation   (please be detailed, any staff messages or encounters to note?): personal conflict      Prior to reschedule please review:  Ordering Provider: GARETT ROSA   Sedation Determined: Moderate  Does patient have any ASC Exclusions, please identify?: No      Notes on Cancelled Procedure:  Procedure: Lower Endoscopy [Colonoscopy]   Date: 11/4/2024  Location: [facility]; [address] [place] [address]   Surgeon: JACQUELYN      Rescheduled: Yes,   Procedure: Lower Endoscopy [Colonoscopy]    Date: 12/16/2024   Location: [facility]; [address] [place] [address]    Surgeon: JACQUELYN   Sedation Level Scheduled  Moderate ,  Reason for Sedation Level Per Order   Instructions updated and sent: Yes, Julienhart     Does patient need PAC or Pre -Op Rescheduled? : No       Did you cancel or rescheduled an EUS procedure? No.

## 2024-11-08 ENCOUNTER — E-VISIT (OUTPATIENT)
Dept: URGENT CARE | Facility: CLINIC | Age: 54
End: 2024-11-08
Payer: COMMERCIAL

## 2024-11-08 DIAGNOSIS — U07.1 INFECTION DUE TO 2019 NOVEL CORONAVIRUS: Primary | ICD-10-CM

## 2024-11-08 PROCEDURE — 99207 PR NON-BILLABLE SERV PER CHARTING: CPT | Performed by: PHYSICIAN ASSISTANT

## 2024-11-08 NOTE — PATIENT INSTRUCTIONS
Dear Eve Byrd    Glad to hear you're feeling better and you tested negative. I added a note under the letters section in MyChart.    Thanks for choosing us as your health care partner,    Diana Lal PA-C

## 2024-11-08 NOTE — LETTER
November 8, 2024      Eve Byrd  [address]  [place] [address]        To Whom It May Concern:    Patient was evaluated today after testing positive and subsequently negative for COVID-19. Before returning to work, she must be fever free for 24 hours and have an improvement in symptoms. Please excuse her from work missed during this time. She may return to work on November 8, 2024.    Sincerely,        Diana Lal PA-C

## 2024-11-29 ENCOUNTER — TELEPHONE (OUTPATIENT)
Dept: GASTROENTEROLOGY | Facility: CLINIC | Age: 54
End: 2024-11-29
Payer: COMMERCIAL

## 2024-11-29 NOTE — TELEPHONE ENCOUNTER
RESCHEDULED PROCEDURE   Reason:Personal conflict    Pre visit planning completed.      Procedure details:    Patient scheduled for Colonoscopy on 12-16-24.     Arrival time: 1315. Procedure time 1400    Facility location: [facility]; [address] [place] [address] [place] [address]. Check in location: 1st [facility], operated by [facility].     Sedation type: Conscious sedation     Pre op exam needed? No.    Indication for procedure: screening      Chart review:     Electronic implanted devices? No    Recent diagnosis of diverticulitis within the last 6 weeks? No      Medication review:    Diabetic? No    Anticoagulants? No    Weight loss medication/injectable? No.    Other medication HOLDING recommendations:  N/A      Prep for procedure:     Bowel prep recommendation: Standard Miralax  Due to: standard bowel prep.    Prep instructions sent via [employer] 11/29/24            Joya Mathew RN  Endoscopy Procedure Pre Assessment RN  [phone] option 2

## 2024-12-02 NOTE — TELEPHONE ENCOUNTER
Attempted to contact patient in order to complete pre assessment questions.     No answer. Left message to return call to [phone] option 2    Pre-op needed? No.    Callback communication sent via [employer].    Joya Mathew RN  Endoscopy Procedure Pre Assessment

## 2024-12-03 ENCOUNTER — LAB (OUTPATIENT)
Dept: LAB | Facility: CLINIC | Age: 54
End: 2024-12-03
Payer: COMMERCIAL

## 2024-12-03 DIAGNOSIS — Z13.6 CARDIOVASCULAR SCREENING; LDL GOAL LESS THAN 160: ICD-10-CM

## 2024-12-03 DIAGNOSIS — E03.9 ACQUIRED HYPOTHYROIDISM: ICD-10-CM

## 2024-12-03 LAB
ALBUMIN SERPL BCG-MCNC: 4.3 G/DL (ref 3.5–5.2)
ALP SERPL-CCNC: 138 U/L (ref 40–150)
ALT SERPL W P-5'-P-CCNC: 40 U/L (ref 0–50)
AST SERPL W P-5'-P-CCNC: 73 U/L (ref 0–45)
BILIRUB DIRECT SERPL-MCNC: <0.2 MG/DL (ref 0–0.3)
BILIRUB SERPL-MCNC: 0.4 MG/DL
CHOLEST SERPL-MCNC: 211 MG/DL
FASTING STATUS PATIENT QL REPORTED: YES
HDLC SERPL-MCNC: 64 MG/DL
LDLC SERPL CALC-MCNC: 129 MG/DL
NONHDLC SERPL-MCNC: 147 MG/DL
PROT SERPL-MCNC: 8 G/DL (ref 6.4–8.3)
T4 FREE SERPL-MCNC: 1.1 NG/DL (ref 0.9–1.7)
TRIGL SERPL-MCNC: 88 MG/DL
TSH SERPL DL<=0.005 MIU/L-ACNC: 9.13 UIU/ML (ref 0.3–4.2)

## 2024-12-03 PROCEDURE — 80076 HEPATIC FUNCTION PANEL: CPT

## 2024-12-03 PROCEDURE — 84439 ASSAY OF FREE THYROXINE: CPT

## 2024-12-03 PROCEDURE — 36415 COLL VENOUS BLD VENIPUNCTURE: CPT

## 2024-12-03 PROCEDURE — 84443 ASSAY THYROID STIM HORMONE: CPT

## 2024-12-03 PROCEDURE — 80061 LIPID PANEL: CPT

## 2024-12-04 NOTE — TELEPHONE ENCOUNTER
Pre assessment completed for upcoming procedure.   (Please see previous telephone encounter notes for complete details)    Patient returned call.       Procedure details:    Arrival time and facility location reviewed.    Pre op exam needed? No.    Designated  policy reviewed. Instructed to have someone stay 6  hours post procedure.       Medication review:    Medications reviewed. Please see supporting documentation below. Holding recommendations discussed (if applicable).       Prep for procedure:     Procedure prep instructions reviewed.        Any additional information needed:  N/A      Patient verbalized understanding and had no questions or concerns at this time.      Gricel Irizarry RN  Endoscopy Procedure Pre Assessment   [phone] option 2

## 2024-12-05 ASSESSMENT — ASTHMA QUESTIONNAIRES
ACT_TOTALSCORE: 24
QUESTION_2 LAST FOUR WEEKS HOW OFTEN HAVE YOU HAD SHORTNESS OF BREATH: NOT AT ALL
QUESTION_1 LAST FOUR WEEKS HOW MUCH OF THE TIME DID YOUR ASTHMA KEEP YOU FROM GETTING AS MUCH DONE AT WORK, SCHOOL OR AT HOME: NONE OF THE TIME
QUESTION_4 LAST FOUR WEEKS HOW OFTEN HAVE YOU USED YOUR RESCUE INHALER OR NEBULIZER MEDICATION (SUCH AS ALBUTEROL): ONCE A WEEK OR LESS
QUESTION_3 LAST FOUR WEEKS HOW OFTEN DID YOUR ASTHMA SYMPTOMS (WHEEZING, COUGHING, SHORTNESS OF BREATH, CHEST TIGHTNESS OR PAIN) WAKE YOU UP AT NIGHT OR EARLIER THAN USUAL IN THE MORNING: NOT AT ALL
ACT_TOTALSCORE: 24
QUESTION_5 LAST FOUR WEEKS HOW WOULD YOU RATE YOUR ASTHMA CONTROL: COMPLETELY CONTROLLED

## 2024-12-10 ENCOUNTER — VIRTUAL VISIT (OUTPATIENT)
Dept: INTERNAL MEDICINE | Facility: CLINIC | Age: 54
End: 2024-12-10
Payer: COMMERCIAL

## 2024-12-10 DIAGNOSIS — F33.2 SEVERE RECURRENT MAJOR DEPRESSION WITHOUT PSYCHOTIC FEATURES (H): ICD-10-CM

## 2024-12-10 DIAGNOSIS — Z12.11 COLON CANCER SCREENING: ICD-10-CM

## 2024-12-10 DIAGNOSIS — E03.9 ACQUIRED HYPOTHYROIDISM: Primary | ICD-10-CM

## 2024-12-10 DIAGNOSIS — Z13.6 CARDIOVASCULAR SCREENING; LDL GOAL LESS THAN 160: ICD-10-CM

## 2024-12-10 DIAGNOSIS — Z12.31 VISIT FOR SCREENING MAMMOGRAM: ICD-10-CM

## 2024-12-10 DIAGNOSIS — I10 BENIGN ESSENTIAL HYPERTENSION: ICD-10-CM

## 2024-12-10 DIAGNOSIS — R94.5 ABNORMAL RESULTS OF LIVER FUNCTION STUDIES: ICD-10-CM

## 2024-12-10 PROCEDURE — 99442 PR PHYSICIAN TELEPHONE EVALUATION 11-20 MIN: CPT | Mod: GT | Performed by: INTERNAL MEDICINE

## 2024-12-10 RX ORDER — LEVOTHYROXINE SODIUM 175 UG/1
175 TABLET ORAL DAILY
Qty: 90 TABLET | Refills: 0 | Status: SHIPPED | OUTPATIENT
Start: 2024-12-10

## 2024-12-10 NOTE — PROGRESS NOTES
"Eve is a 54 year old who is being evaluated via a billable telephone visit.    How would you like to obtain your AVS? MyChart  If the video visit is dropped, the invitation should be resent by: Text to cell phone: [phone]  Will anyone else be joining your video visit? No      Assessment & Plan     Acquired hypothyroidism  Subclinical hypothyroidism noted with TSH greater than 9.  Suggest increasing levothyroxine to 175 mcg and recheck thyroid function test as ordered  - levothyroxine (SYNTHROID/LEVOTHROID) 175 MCG tablet; Take 1 tablet (175 mcg) by mouth daily.  - TSH with free T4 reflex; Future    CARDIOVASCULAR SCREENING; LDL GOAL LESS THAN 160  Discussed lipid management and dietary change.  Patient has started on a new dietary program.    Abnormal results of liver function studies  Suspect component of fatty liver with prior LFT elevated in the past and noted fatty infiltration on abdominal ultrasound dated 2014.  Patient has now cut back on alcohol.  Recheck hepatic panel is a  - Hepatic function panel; Future    Benign essential hypertension  Stable on therapy continue as directed    Severe recurrent major depression without psychotic features (H)  Following up with routine mental health provider as directed    Colon cancer screening  Patient has upcoming colonoscopy scheduled in December.    Visit for screening mammogram  Recommended updated mammography.  - MA Screen Bilateral w/Zia; Future          BMI  Estimated body mass index is 36.56 kg/m  as calculated from the following:    Height as of 4/25/24: 1.626 m (5' 4\").    Weight as of 4/25/24: 96.6 kg (213 lb).   Weight management plan: Discussed healthy diet and exercise guidelines      Work on weight loss  Regular exercise    Subjective   Eve is a 54 year old, presenting for the following health issues:  Recheck Medication      Please note this was scheduled as a video visit but the patient was unable to get the video to work thus it was converted " to telephone.      History of Present Illness       Hyperlipidemia:  She presents for follow up of hyperlipidemia.   She is not taking medication to lower cholesterol. She is not having myalgia or other side effects to statin medications.    Hypothyroidism:     Since last visit, patient describes the following symptoms::  None    She eats 0-1 servings of fruits and vegetables daily.She consumes 0 sweetened beverage(s) daily.She exercises with enough effort to increase her heart rate 9 or less minutes per day.  She exercises with enough effort to increase her heart rate 3 or less days per week.   She is taking medications regularly.         Component      Latest Ref Rng 12/3/2024  1:00 PM   Protein Total      6.4 - 8.3 g/dL 8.0    Albumin      3.5 - 5.2 g/dL 4.3    Bilirubin Total      <=1.2 mg/dL 0.4    Alkaline Phosphatase      40 - 150 U/L 138    AST      0 - 45 U/L 73 (H)    ALT      0 - 50 U/L 40    Bilirubin Direct      0.00 - 0.30 mg/dL <0.20    Cholesterol      <200 mg/dL 211 (H)    Triglycerides      <150 mg/dL 88    HDL Cholesterol      >=50 mg/dL 64    LDL Cholesterol Calculated      <100 mg/dL 129 (H)    Non HDL Cholesterol      <130 mg/dL 147 (H)    Patient Fasting? Yes    TSH      0.30 - 4.20 uIU/mL 9.13 (H)    T4 Free      0.90 - 1.70 ng/dL 1.10       Current Outpatient Medications   Medication Sig Dispense Refill    albuterol (PROAIR RESPICLICK) 108 (90 Base) MCG/ACT inhaler Inhale 1-2 puffs into the lungs every 6 hours as needed for shortness of breath or wheezing. 1 each 1    amLODIPine (NORVASC) 5 MG tablet Take 1 tablet (5 mg) by mouth daily 90 tablet 1    artificial tears OINT ophthalmic ointment Place 1 g into both eyes at bedtime 7 g 11    atenolol (TENORMIN) 50 MG tablet Take 1 tablet by mouth daily at 2 pm      AUVELITY  MG TBCR Take 1 tablet by mouth daily at 2 pm      dextran 70-hypromellose (TEARS NATURALE FREE PF) 0.1-0.3 % ophthalmic solution Place 1 drop into both eyes 4 times  daily. 36 each 11    guaiFENesin-codeine (ROBITUSSIN AC) 100-10 MG/5ML solution Take 5-10 mLs by mouth every 4 hours as needed for cough. 118 mL 0    lamoTRIgine (LAMICTAL) 25 MG tablet 1 po BID (Patient taking differently: daily. 1 po BID) 60 tablet 0    levothyroxine (SYNTHROID/LEVOTHROID) 175 MCG tablet Take 1 tablet (175 mcg) by mouth daily. 90 tablet 0    lisinopril (ZESTRIL) 20 MG tablet Take 1 tablet (20 mg) by mouth 2 times daily 180 tablet 3    Lurasidone HCl (LATUDA PO) Take 80 mg by mouth daily       triamcinolone (KENALOG) 0.1 % external cream Apply topically 2 times daily as needed for irritation 30 g 0     No current facility-administered medications for this visit.         Review of Systems:    CONSTITUTIONAL: NEGATIVE for fever, chills, change in weight  EYES: NEGATIVE for vision changes or irritation  ENT/MOUTH: NEGATIVE for ear, mouth and throat problems  RESP: NEGATIVE for significant cough or SOB  CV: NEGATIVE for chest pain, palpitations or peripheral edema  GI: NEGATIVE for nausea, abdominal pain, heartburn, or change in bowel habits  : NEGATIVE for frequency, dysuria, or hematuria  MUSCULOSKELETAL: NEGATIVE for significant arthralgias or myalgia  HEME: NEGATIVE for bleeding problems  PSYCHIATRIC: NEGATIVE for changes in mood or affect      Objective    Vitals - Patient Reported  Weight (Patient Reported): 90.7 kg (200 lb)      Vitals:  No vitals were obtained today due to virtual visit.    Physical Exam   GENERAL: alert and no distress  EYES: Eyes grossly normal to inspection.  No discharge or erythema, or obvious scleral/conjunctival abnormalities.  RESP: No audible wheeze, cough, or visible cyanosis.    SKIN: Visible skin clear. No significant rash, abnormal pigmentation or lesions.  NEURO: Cranial nerves grossly intact.  Mentation and speech appropriate for age.  PSYCH: Appropriate affect, tone, and pace of words      Telephone visit:  15 minutes  Signed Electronically by: Prieto TELLO  MD Dariel

## 2024-12-11 ENCOUNTER — PATIENT OUTREACH (OUTPATIENT)
Dept: CARE COORDINATION | Facility: CLINIC | Age: 54
End: 2024-12-11
Payer: COMMERCIAL

## 2024-12-16 ENCOUNTER — HOSPITAL ENCOUNTER (OUTPATIENT)
Facility: CLINIC | Age: 54
Discharge: HOME | End: 2024-12-16
Attending: INTERNAL MEDICINE | Admitting: INTERNAL MEDICINE
Payer: COMMERCIAL

## 2024-12-16 VITALS
HEART RATE: 66 BPM | DIASTOLIC BLOOD PRESSURE: 75 MMHG | BODY MASS INDEX: 34.15 KG/M2 | RESPIRATION RATE: 24 BRPM | WEIGHT: 200 LBS | HEIGHT: 64 IN | OXYGEN SATURATION: 98 % | SYSTOLIC BLOOD PRESSURE: 112 MMHG

## 2024-12-16 LAB — COLONOSCOPY: NORMAL

## 2024-12-16 PROCEDURE — G0500 MOD SEDAT ENDO SERVICE >5YRS: HCPCS | Mod: PT | Performed by: INTERNAL MEDICINE

## 2024-12-16 PROCEDURE — 250N000011 HC RX IP 250 OP 636: Performed by: INTERNAL MEDICINE

## 2024-12-16 PROCEDURE — 45380 COLONOSCOPY AND BIOPSY: CPT | Mod: PT | Performed by: INTERNAL MEDICINE

## 2024-12-16 PROCEDURE — 88305 TISSUE EXAM BY PATHOLOGIST: CPT | Mod: 26 | Performed by: PATHOLOGY

## 2024-12-16 PROCEDURE — 88305 TISSUE EXAM BY PATHOLOGIST: CPT | Mod: TC | Performed by: INTERNAL MEDICINE

## 2024-12-16 RX ORDER — FENTANYL CITRATE 50 UG/ML
INJECTION, SOLUTION INTRAMUSCULAR; INTRAVENOUS PRN
Status: DISCONTINUED | OUTPATIENT
Start: 2024-12-16 | End: 2024-12-16 | Stop reason: HOSPADM

## 2024-12-16 ASSESSMENT — ACTIVITIES OF DAILY LIVING (ADL)
ADLS_ACUITY_SCORE: 41

## 2024-12-18 LAB
PATH REPORT.COMMENTS IMP SPEC: NORMAL
PATH REPORT.COMMENTS IMP SPEC: NORMAL
PATH REPORT.FINAL DX SPEC: NORMAL
PATH REPORT.GROSS SPEC: NORMAL
PATH REPORT.MICROSCOPIC SPEC OTHER STN: NORMAL
PATH REPORT.RELEVANT HX SPEC: NORMAL
PHOTO IMAGE: NORMAL

## 2024-12-31 ENCOUNTER — ANCILLARY PROCEDURE (OUTPATIENT)
Dept: MAMMOGRAPHY | Facility: CLINIC | Age: 54
End: 2024-12-31
Attending: INTERNAL MEDICINE
Payer: COMMERCIAL

## 2024-12-31 DIAGNOSIS — Z12.31 VISIT FOR SCREENING MAMMOGRAM: ICD-10-CM

## 2024-12-31 PROCEDURE — 77063 BREAST TOMOSYNTHESIS BI: CPT | Mod: TC | Performed by: RADIOLOGY

## 2024-12-31 PROCEDURE — 77067 SCR MAMMO BI INCL CAD: CPT | Mod: TC | Performed by: RADIOLOGY

## 2025-02-26 ENCOUNTER — LAB (OUTPATIENT)
Dept: LAB | Facility: CLINIC | Age: 55
End: 2025-02-26
Payer: COMMERCIAL

## 2025-02-26 DIAGNOSIS — E03.9 ACQUIRED HYPOTHYROIDISM: ICD-10-CM

## 2025-02-26 DIAGNOSIS — R94.5 ABNORMAL RESULTS OF LIVER FUNCTION STUDIES: ICD-10-CM

## 2025-02-26 LAB
ALBUMIN SERPL BCG-MCNC: 4.1 G/DL (ref 3.5–5.2)
ALP SERPL-CCNC: 116 U/L (ref 40–150)
ALT SERPL W P-5'-P-CCNC: 36 U/L (ref 0–50)
AST SERPL W P-5'-P-CCNC: 67 U/L (ref 0–45)
BILIRUB DIRECT SERPL-MCNC: 0.2 MG/DL (ref 0–0.3)
BILIRUB SERPL-MCNC: 0.5 MG/DL
PROT SERPL-MCNC: 7.6 G/DL (ref 6.4–8.3)
T4 FREE SERPL-MCNC: 1.99 NG/DL (ref 0.9–1.7)
TSH SERPL DL<=0.005 MIU/L-ACNC: 0.14 UIU/ML (ref 0.3–4.2)

## 2025-02-26 PROCEDURE — 84443 ASSAY THYROID STIM HORMONE: CPT

## 2025-02-26 PROCEDURE — 36415 COLL VENOUS BLD VENIPUNCTURE: CPT

## 2025-02-26 PROCEDURE — 80076 HEPATIC FUNCTION PANEL: CPT

## 2025-02-26 PROCEDURE — 84439 ASSAY OF FREE THYROXINE: CPT

## 2025-03-05 ASSESSMENT — PATIENT HEALTH QUESTIONNAIRE - PHQ9
SUM OF ALL RESPONSES TO PHQ QUESTIONS 1-9: 1
SUM OF ALL RESPONSES TO PHQ QUESTIONS 1-9: 1
10. IF YOU CHECKED OFF ANY PROBLEMS, HOW DIFFICULT HAVE THESE PROBLEMS MADE IT FOR YOU TO DO YOUR WORK, TAKE CARE OF THINGS AT HOME, OR GET ALONG WITH OTHER PEOPLE: NOT DIFFICULT AT ALL

## 2025-03-06 ENCOUNTER — VIRTUAL VISIT (OUTPATIENT)
Dept: INTERNAL MEDICINE | Facility: CLINIC | Age: 55
End: 2025-03-06
Payer: COMMERCIAL

## 2025-03-06 DIAGNOSIS — E66.01 MORBID OBESITY (H): ICD-10-CM

## 2025-03-06 DIAGNOSIS — R94.5 ABNORMAL RESULTS OF LIVER FUNCTION STUDIES: ICD-10-CM

## 2025-03-06 DIAGNOSIS — E03.9 ACQUIRED HYPOTHYROIDISM: Primary | ICD-10-CM

## 2025-03-06 RX ORDER — LEVOTHYROXINE SODIUM 175 UG/1
TABLET ORAL
Qty: 45 TABLET | Refills: 0 | Status: SHIPPED | OUTPATIENT
Start: 2025-03-06

## 2025-03-06 RX ORDER — LEVOTHYROXINE SODIUM 150 UG/1
TABLET ORAL
Qty: 45 TABLET | Refills: 0 | Status: SHIPPED | OUTPATIENT
Start: 2025-03-06

## 2025-03-06 NOTE — PROGRESS NOTES
Eve is a 54 year old who is being evaluated via a billable video visit.    How would you like to obtain your AVS? MyChart  If the video visit is dropped, the invitation should be resent by: Text to cell phone: [phone]  Will anyone else be joining your video visit? No      Assessment & Plan     Acquired hypothyroidism  Reviewed with patient she has gone from being sub clincial hypothyroid to subclinical hyperthyroid with a change of her dose from 1 .  I have suggested we alter her dose of that she takes the 150 mcg dose on Monday Wednesday Friday and Sunday and then the 175 dose on Thursday and Saturday with a recheck of her thyroid function test in 3 months.  - levothyroxine (SYNTHROID/LEVOTHROID) 175 MCG tablet; Take 175 mcg on Tuesday, Thursday and Saturday.  - levothyroxine (SYNTHROID/LEVOTHROID) 150 MCG tablet; Take 150 mcg dose on Monday, Wednesday, Friday and Sunday  - TSH with free T4 reflex; Future    Abnormal results of liver function studies  Patient has had a longstanding history of slightly elevated LFTs with an ultrasound dating back to 2014 demonstrating mild fatty liver.  She does use 2 alcoholic drinks per day.  We discussed the options of continued observation versus GI referral.  She would like to continue to observe.  I have asked her to also significantly consider cutting back on her dosing of her alcohol on a daily basis    Morbid obesity (H)  Urged ongoing weight loss.      See Patient Instructions    Subjective   Eve is a 54 year old, presenting for the following health issues:  Results    HPI      Follow up thyroid and liver function from 2/26/25    TSH   Date Value Ref Range Status   02/26/2025 0.14 (L) 0.30 - 4.20 uIU/mL Final   11/02/2022 2.47 0.40 - 4.00 mU/L Final   04/28/2021 15.59 (H) 0.40 - 4.00 mU/L Final     Lab Results   Component Value Date    ALT 36 02/26/2025    ALT 55 04/28/2021     AST   Date Value Ref Range Status   02/26/2025 67 (H) 0 - 45 U/L Final   04/28/2021  43 0 - 45 U/L Final      Review of Systems  CONSTITUTIONAL: NEGATIVE for fever, chills, change in weight  INTEGUMENTARY/SKIN: NEGATIVE for worrisome rashes, moles or lesions  EYES: NEGATIVE for vision changes or irritation  ENT/MOUTH: NEGATIVE for ear, mouth and throat problems  RESP: NEGATIVE for significant cough or SOB  BREAST: NEGATIVE for masses, tenderness or discharge  CV: NEGATIVE for chest pain, palpitations or peripheral edema  GI: NEGATIVE for nausea, abdominal pain, heartburn, or change in bowel habits  : NEGATIVE for frequency, dysuria, or hematuria  MUSCULOSKELETAL: NEGATIVE for significant arthralgias or myalgia  NEURO: NEGATIVE for weakness, dizziness or paresthesias  ENDOCRINE: NEGATIVE for temperature intolerance, skin/hair changes  HEME: NEGATIVE for bleeding problems  PSYCHIATRIC: NEGATIVE for changes in mood or affect      Objective           Vitals:  No vitals were obtained today due to virtual visit.    Physical Exam   GENERAL: alert and no distress  EYES: Eyes grossly normal to inspection.  No discharge or erythema, or obvious scleral/conjunctival abnormalities.  RESP: No audible wheeze, cough, or visible cyanosis.    SKIN: Visible skin clear. No significant rash, abnormal pigmentation or lesions.  NEURO: Cranial nerves grossly intact.  Mentation and speech appropriate for age.  PSYCH: Appropriate affect, tone, and pace of words    TSH   Date Value Ref Range Status   02/26/2025 0.14 (L) 0.30 - 4.20 uIU/mL Final   11/02/2022 2.47 0.40 - 4.00 mU/L Final   04/28/2021 15.59 (H) 0.40 - 4.00 mU/L Final           Video-Visit Details    Type of service:  Video Visit   Originating Location (pt. Location): Home    Distant Location (provider location):  On-site  Platform used for Video Visit: Theo  Signed Electronically by: Prieto Vieira MD      Answers submitted by the patient for this visit:  Patient Health Questionnaire (Submitted on 3/5/2025)  If you checked off any problems, how difficult  have these problems made it for you to do your work, take care of things at home, or get along with other people?: Not difficult at all  PHQ9 TOTAL SCORE: 1

## 2025-04-07 DIAGNOSIS — I10 BENIGN ESSENTIAL HYPERTENSION: ICD-10-CM

## 2025-04-08 RX ORDER — LISINOPRIL 20 MG/1
20 TABLET ORAL 2 TIMES DAILY
Qty: 180 TABLET | Refills: 0 | Status: SHIPPED | OUTPATIENT
Start: 2025-04-08

## 2025-04-15 ENCOUNTER — PATIENT OUTREACH (OUTPATIENT)
Dept: CARE COORDINATION | Facility: CLINIC | Age: 55
End: 2025-04-15
Payer: COMMERCIAL

## 2025-05-01 ENCOUNTER — TELEPHONE (OUTPATIENT)
Dept: INTERNAL MEDICINE | Facility: CLINIC | Age: 55
End: 2025-05-01
Payer: COMMERCIAL

## 2025-05-01 ENCOUNTER — TRANSFERRED RECORDS (OUTPATIENT)
Dept: HEALTH INFORMATION MANAGEMENT | Facility: CLINIC | Age: 55
End: 2025-05-01
Payer: COMMERCIAL

## 2025-05-01 DIAGNOSIS — Z87.891 PERSONAL HISTORY OF TOBACCO USE, PRESENTING HAZARDS TO HEALTH: Primary | ICD-10-CM

## 2025-05-01 NOTE — TELEPHONE ENCOUNTER
Pt called the clinic stating that her AWV is scheduled for July of this year.     She is due for an annual lung cancer screening at the end of this month and is hoping the order can be placed prior to her July appt.     Routing to PCP for review.     Please call pt back with an update.     Can we leave a detailed message on this number? YES  Phone number patient can be reached at: Home number on file [phone] (home)    Antoinette Smith RN  [facility] Triage

## 2025-05-01 NOTE — TELEPHONE ENCOUNTER
Called and left message for the patient informing patient CT scan order has been placed. Upon chart review, patient already had an appointment scheduled.     Appointments in Next Year      May 30, 2025 8:20 AM  (Arrive by 8:05 AM)  CT CHEST LUNG CANCER SCREEN LOW DOSE WITHOUT with EICCT1  [facility] [place] ([facility]) [phone]     Jul 08, 2025 11:00 AM  (Arrive by 10:40 AM)  Adult Preventative Visit with Prieto Vieira MD  [facility] ([facility]) [phone]          Advised patient too call the clinic back if she is needing anything additional prior to her scheduled appt with PCP in July.    Closing encounter.    Gladys Cook RN

## 2025-05-30 ENCOUNTER — ANCILLARY PROCEDURE (OUTPATIENT)
Dept: CT IMAGING | Facility: CLINIC | Age: 55
End: 2025-05-30
Attending: INTERNAL MEDICINE
Payer: COMMERCIAL

## 2025-05-30 ENCOUNTER — RESULTS FOLLOW-UP (OUTPATIENT)
Dept: INTERNAL MEDICINE | Facility: CLINIC | Age: 55
End: 2025-05-30

## 2025-05-30 DIAGNOSIS — Z87.891 PERSONAL HISTORY OF TOBACCO USE, PRESENTING HAZARDS TO HEALTH: ICD-10-CM

## 2025-05-30 PROCEDURE — 71271 CT THORAX LUNG CANCER SCR C-: CPT

## 2025-06-03 DIAGNOSIS — E03.9 ACQUIRED HYPOTHYROIDISM: ICD-10-CM

## 2025-06-03 RX ORDER — LEVOTHYROXINE SODIUM 150 UG/1
TABLET ORAL
Qty: 45 TABLET | Refills: 0 | Status: SHIPPED | OUTPATIENT
Start: 2025-06-03

## 2025-06-03 NOTE — TELEPHONE ENCOUNTER
Medication Question or Refill    Contacts       Contact Date/Time Type Contact Phone/Fax    06/03/2025 02:04 PM CDT Phone (Incoming) Eve Byrd (Self) [phone] (M)     Pt requesting med refill, they will run out of pill before their lab appt on 06/12/2025            What medication are you calling about (include dose and sig)?: levothyroxine (SYNTHROID/LEVOTHROID) 150 MCG tablet     Preferred Pharmacy:       [id number] [address] [address]  Phone: [phone] Fax: [phone]          Controlled Substance Agreement on file:   CSA -- Patient Level:    CSA: None found at the patient level.       Who prescribed the medication?: Prieto Vieira    Do you need a refill? Yes    When did you use the medication last? Pt will run out before their lab appt on 06/12/2025    Patient offered an appointment? Yes: lab appt to check thyroid levels    Do you have any questions or concerns?  No      Could we [facility] this information to you [facility] or would you prefer to receive a phone call?:   Patient would prefer a phone call   Okay to leave a detailed message?: Yes at Cell number on file:    Telephone Information:   Mobile [phone]

## 2025-06-12 ENCOUNTER — LAB (OUTPATIENT)
Dept: LAB | Facility: CLINIC | Age: 55
End: 2025-06-12
Payer: COMMERCIAL

## 2025-06-12 ENCOUNTER — RESULTS FOLLOW-UP (OUTPATIENT)
Dept: INTERNAL MEDICINE | Facility: CLINIC | Age: 55
End: 2025-06-12

## 2025-06-12 DIAGNOSIS — E03.9 ACQUIRED HYPOTHYROIDISM: ICD-10-CM

## 2025-06-12 LAB — TSH SERPL DL<=0.005 MIU/L-ACNC: 0.85 UIU/ML (ref 0.3–4.2)

## 2025-06-19 ENCOUNTER — VIRTUAL VISIT (OUTPATIENT)
Dept: INTERNAL MEDICINE | Facility: CLINIC | Age: 55
End: 2025-06-19
Payer: COMMERCIAL

## 2025-06-19 DIAGNOSIS — E03.9 ACQUIRED HYPOTHYROIDISM: Primary | ICD-10-CM

## 2025-06-19 DIAGNOSIS — I10 BENIGN ESSENTIAL HYPERTENSION: ICD-10-CM

## 2025-06-19 DIAGNOSIS — E66.01 MORBID OBESITY (H): ICD-10-CM

## 2025-06-19 DIAGNOSIS — F33.2 SEVERE RECURRENT MAJOR DEPRESSION WITHOUT PSYCHOTIC FEATURES (H): ICD-10-CM

## 2025-06-19 RX ORDER — LEVOTHYROXINE SODIUM 150 UG/1
TABLET ORAL
Qty: 45 TABLET | Refills: 3 | Status: SHIPPED | OUTPATIENT
Start: 2025-06-19

## 2025-06-19 RX ORDER — LEVOTHYROXINE SODIUM 175 UG/1
TABLET ORAL
Qty: 45 TABLET | Refills: 3 | Status: SHIPPED | OUTPATIENT
Start: 2025-06-19

## 2025-06-19 NOTE — PROGRESS NOTES
"Eve is a 54 year old who is being evaluated via a billable video visit.    How would you like to obtain your AVS? MyChart  If the video visit is dropped, the invitation should be resent by: Text to cell phone: [phone]  Will anyone else be joining your video visit? No      Assessment & Plan     Acquired hypothyroidism  Currently on dosing schedule as noted.  Continue with meds as previously described  - levothyroxine (SYNTHROID/LEVOTHROID) 150 MCG tablet; Take 150 mcg dose on Monday, Wednesday, Friday and Sunday  - levothyroxine (SYNTHROID/LEVOTHROID) 175 MCG tablet; Take 175 mcg on Tuesday, Thursday and Saturday.    Benign essential hypertension  Stable.  Continue with current medical management and recheck blood pressure at upcoming annual exam    Morbid obesity (H)  Encouraged ongoing weight loss and weight reduction.    Severe recurrent major depression without psychotic features (H)  Stable per patient without active complaints.  Currently on Latuda    BMI  Estimated body mass index is 34.33 kg/m  as calculated from the following:    Height as of 12/16/24: 1.626 m (5' 4\").    Weight as of 12/16/24: 90.7 kg (200 lb).   Weight management plan: Discussed healthy diet and exercise guidelines      Subjective   Eve is a 54 year old, presenting for the following health issues:  Thyroid Problem      She has been tolerating her medication without difficulty.  She has been compliant with therapy.  I reviewed with her her most recent thyroid function tests which have demonstrated a relative normalization of her TSH.      History of Present Illness       Hypothyroidism:     Since last visit, patient describes the following symptoms::  None    She eats 0-1 servings of fruits and vegetables daily.She consumes 1 sweetened beverage(s) daily.She exercises with enough effort to increase her heart rate 9 or less minutes per day.  She exercises with enough effort to increase her heart rate 3 or less days per week.   She is " taking medications regularly.          Review of Systems  CONSTITUTIONAL: NEGATIVE for fever, chills, change in weight  EYES: NEGATIVE for vision changes or irritation  ENT/MOUTH: NEGATIVE for ear, mouth and throat problems  RESP: NEGATIVE for significant cough or SOB  CV: NEGATIVE for chest pain, palpitations or peripheral edema  GI: NEGATIVE for nausea, abdominal pain, heartburn, or change in bowel habits  : NEGATIVE for frequency, dysuria, or hematuria  MUSCULOSKELETAL: NEGATIVE for significant arthralgias or myalgia  NEURO: NEGATIVE for weakness, dizziness or paresthesias  HEME: NEGATIVE for bleeding problems  PSYCHIATRIC: NEGATIVE for changes in mood or affect      Objective           Vitals:  No vitals were obtained today due to virtual visit.    Physical Exam   GENERAL: alert and no distress  EYES: Eyes grossly normal to inspection.  No discharge or erythema, or obvious scleral/conjunctival abnormalities.  RESP: No audible wheeze, cough, or visible cyanosis.    SKIN: Visible skin clear. No significant rash, abnormal pigmentation or lesions.  NEURO: Cranial nerves grossly intact.  Mentation and speech appropriate for age.  PSYCH: Appropriate affect, tone, and pace of words        Video-Visit Details    Type of service:  Video Visit   Originating Location (pt. Location): Home    Distant Location (provider location):  On-site  Platform used for Video Visit: Theo  Signed Electronically by: Prieto Vieira MD

## 2025-07-07 SDOH — HEALTH STABILITY: PHYSICAL HEALTH: ON AVERAGE, HOW MANY MINUTES DO YOU ENGAGE IN EXERCISE AT THIS LEVEL?: 0 MIN

## 2025-07-07 SDOH — HEALTH STABILITY: PHYSICAL HEALTH: ON AVERAGE, HOW MANY DAYS PER WEEK DO YOU ENGAGE IN MODERATE TO STRENUOUS EXERCISE (LIKE A BRISK WALK)?: 0 DAYS

## 2025-07-07 ASSESSMENT — ASTHMA QUESTIONNAIRES
QUESTION_1 LAST FOUR WEEKS HOW MUCH OF THE TIME DID YOUR ASTHMA KEEP YOU FROM GETTING AS MUCH DONE AT WORK, SCHOOL OR AT HOME: NONE OF THE TIME
QUESTION_3 LAST FOUR WEEKS HOW OFTEN DID YOUR ASTHMA SYMPTOMS (WHEEZING, COUGHING, SHORTNESS OF BREATH, CHEST TIGHTNESS OR PAIN) WAKE YOU UP AT NIGHT OR EARLIER THAN USUAL IN THE MORNING: NOT AT ALL
QUESTION_4 LAST FOUR WEEKS HOW OFTEN HAVE YOU USED YOUR RESCUE INHALER OR NEBULIZER MEDICATION (SUCH AS ALBUTEROL): NOT AT ALL
QUESTION_5 LAST FOUR WEEKS HOW WOULD YOU RATE YOUR ASTHMA CONTROL: COMPLETELY CONTROLLED
QUESTION_2 LAST FOUR WEEKS HOW OFTEN HAVE YOU HAD SHORTNESS OF BREATH: NOT AT ALL
ACT_TOTALSCORE: 25

## 2025-07-07 ASSESSMENT — SOCIAL DETERMINANTS OF HEALTH (SDOH): HOW OFTEN DO YOU GET TOGETHER WITH FRIENDS OR RELATIVES?: ONCE A WEEK

## 2025-07-08 ENCOUNTER — OFFICE VISIT (OUTPATIENT)
Dept: INTERNAL MEDICINE | Facility: CLINIC | Age: 55
End: 2025-07-08
Payer: COMMERCIAL

## 2025-07-08 VITALS
DIASTOLIC BLOOD PRESSURE: 69 MMHG | RESPIRATION RATE: 14 BRPM | SYSTOLIC BLOOD PRESSURE: 109 MMHG | HEART RATE: 88 BPM | BODY MASS INDEX: 34.69 KG/M2 | TEMPERATURE: 98.2 F | WEIGHT: 203.2 LBS | OXYGEN SATURATION: 99 % | HEIGHT: 64 IN

## 2025-07-08 DIAGNOSIS — F31.70 BIPOLAR AFFECTIVE DISORDER IN REMISSION: ICD-10-CM

## 2025-07-08 DIAGNOSIS — Z00.00 ROUTINE GENERAL MEDICAL EXAMINATION AT A HEALTH CARE FACILITY: Primary | ICD-10-CM

## 2025-07-08 DIAGNOSIS — Z13.6 CARDIOVASCULAR SCREENING; LDL GOAL LESS THAN 160: ICD-10-CM

## 2025-07-08 DIAGNOSIS — Z12.31 VISIT FOR SCREENING MAMMOGRAM: ICD-10-CM

## 2025-07-08 DIAGNOSIS — I10 BENIGN ESSENTIAL HYPERTENSION: ICD-10-CM

## 2025-07-08 DIAGNOSIS — Z12.11 COLON CANCER SCREENING: ICD-10-CM

## 2025-07-08 DIAGNOSIS — Z87.891 PERSONAL HISTORY OF TOBACCO USE, PRESENTING HAZARDS TO HEALTH: ICD-10-CM

## 2025-07-08 DIAGNOSIS — E66.01 MORBID OBESITY (H): ICD-10-CM

## 2025-07-08 LAB
ALBUMIN SERPL BCG-MCNC: 4.3 G/DL (ref 3.5–5.2)
ALP SERPL-CCNC: 151 U/L (ref 40–150)
ALT SERPL W P-5'-P-CCNC: 48 U/L (ref 0–50)
ANION GAP SERPL CALCULATED.3IONS-SCNC: 13 MMOL/L (ref 7–15)
AST SERPL W P-5'-P-CCNC: 84 U/L (ref 0–45)
BILIRUB SERPL-MCNC: 0.5 MG/DL
BUN SERPL-MCNC: 5.4 MG/DL (ref 6–20)
CALCIUM SERPL-MCNC: 9.6 MG/DL (ref 8.8–10.4)
CHLORIDE SERPL-SCNC: 101 MMOL/L (ref 98–107)
CHOLEST SERPL-MCNC: 211 MG/DL
CREAT SERPL-MCNC: 0.78 MG/DL (ref 0.51–0.95)
EGFRCR SERPLBLD CKD-EPI 2021: 89 ML/MIN/1.73M2
ERYTHROCYTE [DISTWIDTH] IN BLOOD BY AUTOMATED COUNT: 12.5 % (ref 10–15)
FASTING STATUS PATIENT QL REPORTED: YES
FASTING STATUS PATIENT QL REPORTED: YES
GLUCOSE SERPL-MCNC: 109 MG/DL (ref 70–99)
HCO3 SERPL-SCNC: 24 MMOL/L (ref 22–29)
HCT VFR BLD AUTO: 39.8 % (ref 35–47)
HDLC SERPL-MCNC: 75 MG/DL
HGB BLD-MCNC: 13.8 G/DL (ref 11.7–15.7)
LDLC SERPL CALC-MCNC: 117 MG/DL
MCH RBC QN AUTO: 37.1 PG (ref 26.5–33)
MCHC RBC AUTO-ENTMCNC: 34.7 G/DL (ref 31.5–36.5)
MCV RBC AUTO: 107 FL (ref 78–100)
NONHDLC SERPL-MCNC: 136 MG/DL
PLATELET # BLD AUTO: 173 10E3/UL (ref 150–450)
POTASSIUM SERPL-SCNC: 4.1 MMOL/L (ref 3.4–5.3)
PROT SERPL-MCNC: 8.1 G/DL (ref 6.4–8.3)
RBC # BLD AUTO: 3.72 10E6/UL (ref 3.8–5.2)
SODIUM SERPL-SCNC: 138 MMOL/L (ref 135–145)
TRIGL SERPL-MCNC: 94 MG/DL
VIT B12 SERPL-MCNC: 244 PG/ML (ref 232–1245)
WBC # BLD AUTO: 5.8 10E3/UL (ref 4–11)

## 2025-07-08 PROCEDURE — 99396 PREV VISIT EST AGE 40-64: CPT | Performed by: INTERNAL MEDICINE

## 2025-07-08 PROCEDURE — 3078F DIAST BP <80 MM HG: CPT | Performed by: INTERNAL MEDICINE

## 2025-07-08 PROCEDURE — 82607 VITAMIN B-12: CPT | Performed by: INTERNAL MEDICINE

## 2025-07-08 PROCEDURE — 1126F AMNT PAIN NOTED NONE PRSNT: CPT | Performed by: INTERNAL MEDICINE

## 2025-07-08 PROCEDURE — 80061 LIPID PANEL: CPT | Performed by: INTERNAL MEDICINE

## 2025-07-08 PROCEDURE — 99214 OFFICE O/P EST MOD 30 MIN: CPT | Mod: 25 | Performed by: INTERNAL MEDICINE

## 2025-07-08 PROCEDURE — 80053 COMPREHEN METABOLIC PANEL: CPT | Performed by: INTERNAL MEDICINE

## 2025-07-08 PROCEDURE — 36415 COLL VENOUS BLD VENIPUNCTURE: CPT | Performed by: INTERNAL MEDICINE

## 2025-07-08 PROCEDURE — 3074F SYST BP LT 130 MM HG: CPT | Performed by: INTERNAL MEDICINE

## 2025-07-08 PROCEDURE — 85027 COMPLETE CBC AUTOMATED: CPT | Performed by: INTERNAL MEDICINE

## 2025-07-08 RX ORDER — LISINOPRIL 20 MG/1
20 TABLET ORAL 2 TIMES DAILY
Qty: 180 TABLET | Refills: 3 | Status: SHIPPED | OUTPATIENT
Start: 2025-07-08

## 2025-07-08 ASSESSMENT — PAIN SCALES - GENERAL: PAINLEVEL_OUTOF10: NO PAIN (0)

## 2025-07-08 NOTE — PATIENT INSTRUCTIONS
Patient Education   Preventive Care Advice   This is general advice given by our system to help you stay healthy. However, your care team may have specific advice just for you. Please talk to your care team about your preventive care needs.  Nutrition  Eat 5 or more servings of fruits and vegetables each day.  Try wheat bread, brown rice and whole grain pasta (instead of white bread, rice, and pasta).  Get enough calcium and vitamin D. Check the label on foods and aim for 100% of the RDA (recommended daily allowance).  Lifestyle  Exercise at least 150 minutes each week  (30 minutes a day, 5 days a week).  Do muscle strengthening activities 2 days a week. These help control your weight and prevent disease.  No smoking.  Wear sunscreen to prevent skin cancer.  Have a dental exam and cleaning every 6 months.  Yearly exams  See your health care team every year to talk about:  Any changes in your health.  Any medicines your care team has prescribed.  Preventive care, family planning, and ways to prevent chronic diseases.  Shots (vaccines)   HPV shots (up to age 26), if you've never had them before.  Hepatitis B shots (up to age 59), if you've never had them before.  COVID-19 shot: Get this shot when it's due.  Flu shot: Get a flu shot every year.  Tetanus shot: Get a tetanus shot every 10 years.  Pneumococcal, hepatitis A, and RSV shots: Ask your care team if you need these based on your risk.  Shingles shot (for age 50 and up)  General health tests  Diabetes screening:  Starting at age 35, Get screened for diabetes at least every 3 years.  If you are younger than age 35, ask your care team if you should be screened for diabetes.  Cholesterol test: At age 39, start having a cholesterol test every 5 years, or more often if advised.  Bone density scan (DEXA): At age 50, ask your care team if you should have this scan for osteoporosis (brittle bones).  Hepatitis C: Get tested at least once in your life.  STIs (sexually  transmitted infections)  Before age 24: Ask your care team if you should be screened for STIs.  After age 24: Get screened for STIs if you're at risk. You are at risk for STIs (including HIV) if:  You are sexually active with more than one person.  You don't use condoms every time.  You or a partner was diagnosed with a sexually transmitted infection.  If you are at risk for HIV, ask about PrEP medicine to prevent HIV.  Get tested for HIV at least once in your life, whether you are at risk for HIV or not.  Cancer screening tests  Cervical cancer screening: If you have a cervix, begin getting regular cervical cancer screening tests starting at age 21.  Breast cancer scan (mammogram): If you've ever had breasts, begin having regular mammograms starting at age 40. This is a scan to check for breast cancer.  Colon cancer screening: It is important to start screening for colon cancer at age 45.  Have a colonoscopy test every 10 years (or more often if you're at risk) Or, ask your provider about stool tests like a FIT test every year or Cologuard test every 3 years.  To learn more about your testing options, visit:   .  For help making a decision, visit:   https://bit.ly/uj44387.  Prostate cancer screening test: If you have a prostate, ask your care team if a prostate cancer screening test (PSA) at age 55 is right for you.  Lung cancer screening: If you are a current or former smoker ages 50 to 80, ask your care team if ongoing lung cancer screenings are right for you.  For informational purposes only. Not to replace the advice of your health care provider. Copyright   2023 [employer]. All rights reserved. Clinically reviewed by the [facility] Transitions Program. [employer] 765910 - REV 01/24.

## 2025-07-08 NOTE — PROGRESS NOTES
Preventive Care Visit  [facility]  Prieto Vieira MD, Internal Medicine  Jul 8, 2025      Assessment & Plan     Routine general medical examination at a health care facility  Advised patient consider updating routine vaccinations accordingly.  - Comprehensive metabolic panel; Future  - CBC with platelets; Future  - Lipid Profile; Future    Bipolar affective disorder in remission  Patient following up with mental health provider as directed.  Continuing with Lamictal and Latuda as ordered    Benign essential hypertension  Stable on therapy.  Continue with current medical management.  Recommend recheck blood pressure 6  - lisinopril (ZESTRIL) 20 MG tablet; Take 1 tablet (20 mg) by mouth 2 times daily.    Morbid obesity (H)  Discussed with patient GLP medications and potential dosing pending insurance coverage and cost    CARDIOVASCULAR SCREENING; LDL GOAL LESS THAN 160  Labs ordered as fasting per routine.    Personal history of tobacco use, presenting hazards to health  CT scan of the chest reviewed with patient.  Suggest recommended continued annual screening due in May.    Colon cancer screening  Prior colonoscopy reviewed with patient.  Advised patient to contact her colonoscopy provider Dr. Aguirre to determine subsequent follow-up timing of colonoscopy    Visit for screening mammogram  Recommended updated routine gynecological exam as scheduled through her gynecologist and mammography in December    Patient has been advised of split billing requirements and indicates understanding: Yes    Counseling  Appropriate preventive services were addressed with this patient via screening, questionnaire, or discussion as appropriate for fall prevention, nutrition, physical activity, Tobacco-use cessation, social engagement, weight loss and cognition.  Checklist reviewing preventive services available has been given to the patient.  Reviewed patient's diet, addressing concerns and/or questions.    Reviewed preventive health counseling, as reflected in patient instructions    Subjective   Eve is a 54 year old, presenting for the following:  Physical       HPI     Advance Care Planning    Discussed advance care planning with patient; informed AVS has link to [employer].        7/7/2025   General Health   How would you rate your overall physical health? (!) FAIR   Feel stress (tense, anxious, or unable to sleep) Not at all         7/7/2025   Nutrition   Three or more servings of calcium each day? (!) NO   Diet: Regular (no restrictions)   How many servings of fruit and vegetables per day? (!) 0-1   How many sweetened beverages each day? 0-1         7/7/2025   Exercise   Days per week of moderate/strenous exercise 0 days   Average minutes spent exercising at this level 0 min   (!) EXERCISE CONCERN      7/7/2025   Social Factors   Frequency of gathering with friends or relatives Once a week   Worry food won't last until get money to buy more No   Food not last or not have enough money for food? No   Do you have housing? (Housing is defined as stable permanent housing and does not include staying outside in a car, in a tent, in an abandoned building, in an overnight shelter, or couch-surfing.) Yes   Are you worried about losing your housing? No   Lack of transportation? No   Unable to get utilities (heat,electricity)? No         7/7/2025   Fall Risk   Fallen 2 or more times in the past year? No   Trouble with walking or balance? No          7/7/2025   Dental   Dentist two times every year? Yes     Today's PHQ-9 Score:       3/5/2025    10:28 PM   PHQ-9 SCORE   PHQ-9 Total Score MyChart 1 (Minimal depression)   PHQ-9 Total Score 1        Patient-reported           7/7/2025   Substance Use   Alcohol more than 3/day or more than 7/wk No   Do you use any other substances recreationally? No     Social History     Tobacco Use    Smoking status: Former     Current packs/day: 0.00     Average packs/day: 1 pack/day  for 34.2 years (34.2 ttl pk-yrs)     Types: Cigarettes     Start date: 1983     Quit date: 3/1/2017     Years since quittin.3    Smokeless tobacco: Never   Vaping Use    Vaping status: Never Used   Substance Use Topics    Alcohol use: Yes     Comment: Socially     Drug use: No           2024   LAST FHS-7 RESULTS   1st degree relative breast or ovarian cancer No   Any relative bilateral breast cancer No   Any male have breast cancer No   Any ONE woman have BOTH breast AND ovarian cancer No   Any woman with breast cancer before 50yrs No   2 or more relatives with breast AND/OR ovarian cancer No   2 or more relatives with breast AND/OR bowel cancer No     Mammogram Screening - Mammogram every 1-2 years updated in Health Maintenance based on mutual decision making        2025   STI Screening   New sexual partner(s) since last STI/HIV test? No     History of abnormal Pap smear: No - age 30- 64 PAP with HPV every 5 years recommended        Latest Ref Rng & Units 2023     1:08 PM 11/15/2016     3:45 PM 11/15/2016    12:00 AM   PAP / HPV   PAP  Negative for Intraepithelial Lesion or Malignancy (NILM)      PAP (Historical)    NIL    HPV 16 DNA Negative Negative  Negative     HPV 18 DNA Negative Negative  Negative     Other HR HPV Negative Negative  Negative       ASCVD Risk   The 10-year ASCVD risk score (Hamida KRISHNA, et al., 2019) is: 1.8%    Values used to calculate the score:      Age: 54 years      Sex: Female      Is Non- : No      Diabetic: No      Tobacco smoker: No      Systolic Blood Pressure: 109 mmHg      Is BP treated: Yes      HDL Cholesterol: 64 mg/dL      Total Cholesterol: 211 mg/dL      Reviewed and updated as needed this visit by Provider                    Lab work is in process      Review of Systems  CONSTITUTIONAL: NEGATIVE for fever, chills, change in weight  EYES: NEGATIVE for vision changes or irritation  ENT/MOUTH: NEGATIVE for ear, mouth and  "throat problems  RESP: NEGATIVE for significant cough or SOB  CV: NEGATIVE for chest pain, palpitations or peripheral edema  GI: NEGATIVE for nausea, abdominal pain, heartburn, or change in bowel habits  : NEGATIVE for frequency, dysuria, or hematuria  MUSCULOSKELETAL: NEGATIVE for significant arthralgias or myalgia  NEURO: NEGATIVE for weakness, dizziness or paresthesias  ENDOCRINE: NEGATIVE for temperature intolerance, skin/hair changes  HEME: NEGATIVE for bleeding problems  PSYCHIATRIC: NEGATIVE for changes in mood or affect     Objective    Exam  /69   Pulse 88   Temp 98.2  F (36.8  C) (Temporal)   Resp 14   Ht 1.613 m (5' 3.5\")   Wt 92.2 kg (203 lb 3.2 oz)   LMP 04/12/2017   SpO2 99%   BMI 35.43 kg/m     Estimated body mass index is 35.43 kg/m  as calculated from the following:    Height as of this encounter: 1.613 m (5' 3.5\").    Weight as of this encounter: 92.2 kg (203 lb 3.2 oz).    Physical Exam  GENERAL: alert and no distress  EYES: Eyes grossly normal to inspection, PERRL and conjunctivae and sclerae normal  HENT: ear canals and TM's normal, nose and mouth without ulcers or lesions  NECK: no adenopathy, no asymmetry, masses, or scars  RESP: lungs clear to auscultation - no rales, rhonchi or wheezes  CV: regular rate and rhythm, normal S1 S2, no S3 or S4, no murmur, click or rub, no peripheral edema  ABDOMEN: soft, nontender, no hepatosplenomegaly, no masses and bowel sounds normal  ABDOMEN: obese, mild  NEURO: No focal changes  PSYCH: mentation appears normal, affect normal/bright      Signed Electronically by: Prieto Vieira MD    "

## (undated) RX ORDER — FENTANYL CITRATE 50 UG/ML
INJECTION, SOLUTION INTRAMUSCULAR; INTRAVENOUS
Status: DISPENSED
Start: 2024-12-16